# Patient Record
Sex: FEMALE | Race: WHITE | Employment: OTHER
[De-identification: names, ages, dates, MRNs, and addresses within clinical notes are randomized per-mention and may not be internally consistent; named-entity substitution may affect disease eponyms.]

---

## 2017-01-30 ENCOUNTER — OFFICE VISIT (OUTPATIENT)
Dept: PULMONOLOGY | Facility: CLINIC | Age: 67
End: 2017-01-30

## 2017-01-30 VITALS
RESPIRATION RATE: 16 BRPM | SYSTOLIC BLOOD PRESSURE: 145 MMHG | DIASTOLIC BLOOD PRESSURE: 76 MMHG | OXYGEN SATURATION: 96 % | WEIGHT: 231.6 LBS | BODY MASS INDEX: 37.22 KG/M2 | HEART RATE: 40 BPM | HEIGHT: 66 IN

## 2017-01-30 DIAGNOSIS — Z99.89 OSA ON CPAP: Primary | ICD-10-CM

## 2017-01-30 DIAGNOSIS — J45.30 MILD PERSISTENT ASTHMA WITHOUT COMPLICATION: ICD-10-CM

## 2017-01-30 DIAGNOSIS — D86.3 SARCOIDOSIS OF SKIN (HCC): ICD-10-CM

## 2017-01-30 DIAGNOSIS — G47.33 OSA ON CPAP: Primary | ICD-10-CM

## 2017-01-30 DIAGNOSIS — R05.3 CHRONIC COUGH: ICD-10-CM

## 2017-01-30 PROCEDURE — 99213 OFFICE O/P EST LOW 20 MIN: CPT | Performed by: INTERNAL MEDICINE

## 2017-01-30 PROCEDURE — G8427 DOCREV CUR MEDS BY ELIG CLIN: HCPCS | Performed by: INTERNAL MEDICINE

## 2017-01-30 PROCEDURE — 4040F PNEUMOC VAC/ADMIN/RCVD: CPT | Performed by: INTERNAL MEDICINE

## 2017-01-30 PROCEDURE — 1123F ACP DISCUSS/DSCN MKR DOCD: CPT | Performed by: INTERNAL MEDICINE

## 2017-01-30 PROCEDURE — 3014F SCREEN MAMMO DOC REV: CPT | Performed by: INTERNAL MEDICINE

## 2017-01-30 PROCEDURE — 3017F COLORECTAL CA SCREEN DOC REV: CPT | Performed by: INTERNAL MEDICINE

## 2017-01-30 PROCEDURE — 1036F TOBACCO NON-USER: CPT | Performed by: INTERNAL MEDICINE

## 2017-01-30 PROCEDURE — G8419 CALC BMI OUT NRM PARAM NOF/U: HCPCS | Performed by: INTERNAL MEDICINE

## 2017-01-30 PROCEDURE — G8484 FLU IMMUNIZE NO ADMIN: HCPCS | Performed by: INTERNAL MEDICINE

## 2017-01-30 PROCEDURE — 1090F PRES/ABSN URINE INCON ASSESS: CPT | Performed by: INTERNAL MEDICINE

## 2017-01-30 PROCEDURE — G8400 PT W/DXA NO RESULTS DOC: HCPCS | Performed by: INTERNAL MEDICINE

## 2017-01-30 RX ORDER — LORAZEPAM 0.5 MG/1
0.5 TABLET ORAL EVERY 6 HOURS PRN
COMMUNITY

## 2017-01-30 RX ORDER — FEXOFENADINE HCL 180 MG/1
180 TABLET ORAL DAILY
COMMUNITY

## 2017-01-30 RX ORDER — OMEPRAZOLE 40 MG/1
40 CAPSULE, DELAYED RELEASE ORAL DAILY
COMMUNITY

## 2017-01-30 RX ORDER — CROMOLYN SODIUM 20 MG/2ML
20 INHALANT INTRABRONCHIAL DAILY PRN
COMMUNITY
End: 2019-01-21 | Stop reason: SDUPTHER

## 2017-01-30 RX ORDER — LEVALBUTEROL INHALATION SOLUTION 0.63 MG/3ML
1 SOLUTION RESPIRATORY (INHALATION) EVERY 8 HOURS PRN
COMMUNITY
End: 2017-01-30 | Stop reason: SDUPTHER

## 2017-01-30 RX ORDER — BENZONATATE 100 MG/1
100 CAPSULE ORAL 3 TIMES DAILY PRN
Qty: 90 CAPSULE | Refills: 11 | Status: SHIPPED | OUTPATIENT
Start: 2017-01-30 | End: 2017-02-06

## 2017-01-30 RX ORDER — LEVALBUTEROL INHALATION SOLUTION 0.63 MG/3ML
1 SOLUTION RESPIRATORY (INHALATION) EVERY 8 HOURS PRN
Qty: 90 VIAL | Refills: 11 | Status: SHIPPED | OUTPATIENT
Start: 2017-01-30 | End: 2018-04-20 | Stop reason: SDUPTHER

## 2017-01-30 RX ORDER — HYDROCHLOROTHIAZIDE 25 MG/1
25 TABLET ORAL DAILY
COMMUNITY

## 2017-01-30 RX ORDER — MULTIVITAMIN WITH IRON
250 TABLET ORAL DAILY
COMMUNITY

## 2017-01-30 ASSESSMENT — ENCOUNTER SYMPTOMS
EYES NEGATIVE: 1
ABDOMINAL DISTENTION: 1
COLOR CHANGE: 0
APNEA: 1

## 2017-03-24 ENCOUNTER — TELEPHONE (OUTPATIENT)
Dept: PULMONOLOGY | Age: 67
End: 2017-03-24

## 2017-04-11 ENCOUNTER — TELEPHONE (OUTPATIENT)
Dept: PULMONOLOGY | Age: 67
End: 2017-04-11

## 2017-04-11 DIAGNOSIS — D86.3 SARCOIDOSIS OF SKIN (HCC): ICD-10-CM

## 2017-04-11 DIAGNOSIS — Z99.89 OSA ON CPAP: Primary | ICD-10-CM

## 2017-04-11 DIAGNOSIS — G47.33 OSA ON CPAP: Primary | ICD-10-CM

## 2017-04-11 DIAGNOSIS — R05.3 CHRONIC COUGH: ICD-10-CM

## 2017-08-11 ENCOUNTER — HOSPITAL ENCOUNTER (OUTPATIENT)
Dept: MAMMOGRAPHY | Age: 67
Discharge: HOME | End: 2017-08-11
Payer: MEDICARE

## 2017-08-11 DIAGNOSIS — Z12.31 ENCOUNTER FOR SCREENING MAMMOGRAM FOR MALIGNANT NEOPLASM OF BREAST: ICD-10-CM

## 2017-08-11 PROCEDURE — G0202 SCR MAMMO BI INCL CAD: HCPCS

## 2018-01-22 ENCOUNTER — OFFICE VISIT (OUTPATIENT)
Dept: PULMONOLOGY | Age: 68
End: 2018-01-22
Payer: MEDICARE

## 2018-01-22 VITALS
BODY MASS INDEX: 40.18 KG/M2 | HEIGHT: 66 IN | DIASTOLIC BLOOD PRESSURE: 77 MMHG | WEIGHT: 250 LBS | SYSTOLIC BLOOD PRESSURE: 149 MMHG | HEART RATE: 64 BPM | TEMPERATURE: 97.2 F | OXYGEN SATURATION: 99 % | RESPIRATION RATE: 14 BRPM

## 2018-01-22 DIAGNOSIS — Z99.89 OSA ON CPAP: ICD-10-CM

## 2018-01-22 DIAGNOSIS — G47.33 OSA ON CPAP: ICD-10-CM

## 2018-01-22 DIAGNOSIS — D86.9 SARCOID: ICD-10-CM

## 2018-01-22 DIAGNOSIS — E66.01 MORBID OBESITY (HCC): ICD-10-CM

## 2018-01-22 DIAGNOSIS — J45.909 MILD ASTHMA, UNSPECIFIED WHETHER COMPLICATED, UNSPECIFIED WHETHER PERSISTENT: ICD-10-CM

## 2018-01-22 DIAGNOSIS — R06.09 DYSPNEA ON EXERTION: Primary | ICD-10-CM

## 2018-01-22 PROCEDURE — 99213 OFFICE O/P EST LOW 20 MIN: CPT | Performed by: NURSE PRACTITIONER

## 2018-01-22 RX ORDER — VITAMIN B COMPLEX
1 CAPSULE ORAL DAILY
COMMUNITY

## 2018-01-22 ASSESSMENT — ENCOUNTER SYMPTOMS
SHORTNESS OF BREATH: 1
ALLERGIC/IMMUNOLOGIC NEGATIVE: 1
ABDOMINAL PAIN: 1
COUGH: 0
WHEEZING: 0
EYES NEGATIVE: 1
CHEST TIGHTNESS: 1

## 2018-01-22 NOTE — PROGRESS NOTES
Subjective:      Patient ID: Adelaida Forrester is a 75 y.o. female. HPI    Priti Lopez is here for a follow up for her JANIE, asthma, and sarcoid. She notes that she is compliant with CPAP going to bed at 12-1 am, and wakes at 8 am with restful sleep. She does note that she has gained 28 pounds over the last year, and is questioning if it is related to her Levemir use. She manages her blood suagars at home with 4x daily accuchecks and reports her blood sugar is rarely 200. She is concerned that she has been having a 3 month history of shortness of breath and a midsternal \"catch\" when she is exposed to cold weather. She states she is covering her mouth and nose prior to going outside, but this \"catch\" is interfering with her daily activites, and she is now requiring her  to drive her to and from work. She denies coughing and wheezing associated, and reports that it feels like the air is not \"getting down\" and has to take several panting type deep breaths to relieve the sensation. She denies shortness of breath with activity, walking to her car in the garage, grocery shopping etc, no cough, no fevers, no sleep disturbances. Review of Systems   Constitutional: Positive for activity change. No is having her  drive her to and from work due the sensation of a midsternal \"catch\" when she is exposed to cold air. HENT: Negative. Eyes: Negative. Respiratory: Positive for chest tightness and shortness of breath. Negative for cough and wheezing. She is experiencing a midsternal catch, with shortness of breath when exposed to cold air, she has to take several panting deep breaths to relieve sensation of the \"catch\" so that air \"can go down\" no cough no wheeze. Cardiovascular: Negative. Gastrointestinal: Positive for abdominal pain. Upper abdominal pain/tenderness at the previous abdominal abscess site. With subjective increased \"puffiness\".  She denies fevers, pain, [phone]    Patient Name: Teja Ley: [id number]  Path Number: [id number]  Collected: 7/20/2015  Received: 7/21/2015  Reported: 7/22/2015 13:37    -- Diagnosis --  SKIN, RIGHT NOSE, PUNCH BIOPSY:      -  DERMAL TELANGIECTASIA, SUGGESTIVE OF AN EARLY HEMANGIOMA. -  SEE COMMENT. -- Diagnosis Comment --  IF THIS DOES NOT APPEAR TO BE A LOCALIZED PROCESS CLINICALLY, THE  DIFFERENTIAL DIAGNOSIS COULD ALSO INCLUDE THE TELANGIECTATIC PATTERN  OR ACNE ROSACEA. Durga Bonilla M.D.  **Electronically Signed Out**         als/7/22/2015      Clinical Information  Pre-op Diagnosis:  ROSACEA, SARCOID, SEB. DERM    Operative Findings:  R NOSE  RED PATCH  Operation Performed:  PUNCH      Source of Specimen  1: JAR #1 R NOSE    Gross Description  \"AMBER PEARSON,  R NOSE\" A 0.2 cm long x 0.2 cm diameter tan  punch. Inked intact, 1cs. as      Microscopic Description  Skin to the deep dermis shows unremarkable epidermis. The dermis is  elastotic and contains increased numbers of dilated blood vessels in  the superficial and mid dermis. There is a sparse perivascular  lymphocytic infiltrate, but there is no evidence of perifollicular  inflammation or epidermal spongiosis. There is no evidence of  granulomatous inflammation or malignancy. TR-0         Assessment:      1. Dyspnea on exertion    2. JANIE on CPAP    3. Mild asthma, unspecified whether complicated, unspecified whether persistent    4. Sarcoid ([address]    5. Morbid obesity ([facility] [address]          Plan:      1. PFT (she will return to schedule with [facility])  2. 2 view CXR  3. Handicap sticker per Dr. Attila Almanza  4. Advised to use Xopenex prior to going outside/activity, and to cover nose and mouth prior to cold air exposure. 5. Follow up in 1 year, or sooner if needed. We will call her with CXR results. 6. Continue Xopenex and Intal nebulizers. States she is allergic to MDI propellant.    7. Discussed Flu

## 2018-01-31 ENCOUNTER — OFFICE VISIT (OUTPATIENT)
Dept: PULMONOLOGY | Age: 68
End: 2018-01-31
Payer: MEDICARE

## 2018-01-31 ENCOUNTER — HOSPITAL ENCOUNTER (OUTPATIENT)
Dept: GENERAL RADIOLOGY | Age: 68
Discharge: HOME | End: 2018-02-02
Payer: MEDICARE

## 2018-01-31 ENCOUNTER — HOSPITAL ENCOUNTER (OUTPATIENT)
Age: 68
Discharge: HOME | End: 2018-02-02
Payer: MEDICARE

## 2018-01-31 ENCOUNTER — TELEPHONE (OUTPATIENT)
Dept: PULMONOLOGY | Age: 68
End: 2018-01-31

## 2018-01-31 VITALS — WEIGHT: 250 LBS | HEIGHT: 66 IN | OXYGEN SATURATION: 99 % | HEART RATE: 71 BPM | BODY MASS INDEX: 40.18 KG/M2

## 2018-01-31 DIAGNOSIS — R06.09 DYSPNEA ON EXERTION: ICD-10-CM

## 2018-01-31 DIAGNOSIS — D86.9 SARCOID: Primary | ICD-10-CM

## 2018-01-31 DIAGNOSIS — G47.33 OSA ON CPAP: ICD-10-CM

## 2018-01-31 DIAGNOSIS — J45.50 SEVERE PERSISTENT ASTHMA WITHOUT COMPLICATION: Primary | ICD-10-CM

## 2018-01-31 DIAGNOSIS — Z99.89 OSA ON CPAP: ICD-10-CM

## 2018-01-31 PROCEDURE — 94726 PLETHYSMOGRAPHY LUNG VOLUMES: CPT | Performed by: INTERNAL MEDICINE

## 2018-01-31 PROCEDURE — 94375 RESPIRATORY FLOW VOLUME LOOP: CPT | Performed by: INTERNAL MEDICINE

## 2018-01-31 PROCEDURE — 71046 X-RAY EXAM CHEST 2 VIEWS: CPT

## 2018-01-31 PROCEDURE — 94729 DIFFUSING CAPACITY: CPT | Performed by: INTERNAL MEDICINE

## 2018-01-31 NOTE — TELEPHONE ENCOUNTER
Patient says she was supposed to get a handicap sticker the last time she was here on 1/22/2018 and I don't see a order. Can you please put this order in. Thank you.

## 2018-02-12 ENCOUNTER — TELEPHONE (OUTPATIENT)
Dept: PULMONOLOGY | Age: 68
End: 2018-02-12

## 2018-02-12 DIAGNOSIS — R91.1 LUNG NODULE SEEN ON IMAGING STUDY: Primary | ICD-10-CM

## 2018-02-12 NOTE — TELEPHONE ENCOUNTER
Paige Davenport NP came to me to tell me this patient needs a CT. I spoke to the patient, gave her schedulings number and told her to call us a few days after to see what the results are.

## 2018-02-16 ENCOUNTER — HOSPITAL ENCOUNTER (OUTPATIENT)
Dept: CT IMAGING | Facility: CLINIC | Age: 68
Discharge: HOME | End: 2018-02-18
Payer: MEDICARE

## 2018-02-16 DIAGNOSIS — R91.1 LUNG NODULE SEEN ON IMAGING STUDY: ICD-10-CM

## 2018-02-16 PROCEDURE — 71250 CT THORAX DX C-: CPT

## 2018-03-07 RX ORDER — BENZONATATE 100 MG/1
CAPSULE ORAL
Qty: 90 CAPSULE | Refills: 10 | Status: SHIPPED | OUTPATIENT
Start: 2018-03-07 | End: 2019-03-11 | Stop reason: SDUPTHER

## 2018-04-20 DIAGNOSIS — J45.30 MILD PERSISTENT ASTHMA WITHOUT COMPLICATION: ICD-10-CM

## 2018-04-23 RX ORDER — LEVALBUTEROL INHALATION SOLUTION 0.63 MG/3ML
SOLUTION RESPIRATORY (INHALATION)
Qty: 90 VIAL | Refills: 9 | Status: SHIPPED | OUTPATIENT
Start: 2018-04-23 | End: 2021-10-12 | Stop reason: SDUPTHER

## 2018-08-06 ENCOUNTER — OFFICE VISIT (OUTPATIENT)
Dept: PULMONOLOGY | Age: 68
End: 2018-08-06
Payer: MEDICARE

## 2018-08-06 VITALS
SYSTOLIC BLOOD PRESSURE: 151 MMHG | TEMPERATURE: 98.1 F | BODY MASS INDEX: 41.95 KG/M2 | OXYGEN SATURATION: 96 % | DIASTOLIC BLOOD PRESSURE: 75 MMHG | HEART RATE: 63 BPM | RESPIRATION RATE: 14 BRPM | HEIGHT: 66 IN | WEIGHT: 261 LBS

## 2018-08-06 DIAGNOSIS — R04.2 HEMOPTYSIS: ICD-10-CM

## 2018-08-06 DIAGNOSIS — G47.33 OSA ON CPAP: Primary | ICD-10-CM

## 2018-08-06 DIAGNOSIS — R05.3 CHRONIC COUGH: ICD-10-CM

## 2018-08-06 DIAGNOSIS — J45.909 MILD ASTHMA, UNSPECIFIED WHETHER COMPLICATED, UNSPECIFIED WHETHER PERSISTENT: ICD-10-CM

## 2018-08-06 DIAGNOSIS — R91.1 LUNG NODULE SEEN ON IMAGING STUDY: ICD-10-CM

## 2018-08-06 DIAGNOSIS — Z99.89 OSA ON CPAP: Primary | ICD-10-CM

## 2018-08-06 DIAGNOSIS — J40 BRONCHITIS: ICD-10-CM

## 2018-08-06 PROCEDURE — 1036F TOBACCO NON-USER: CPT | Performed by: NURSE PRACTITIONER

## 2018-08-06 PROCEDURE — 1123F ACP DISCUSS/DSCN MKR DOCD: CPT | Performed by: NURSE PRACTITIONER

## 2018-08-06 PROCEDURE — 1101F PT FALLS ASSESS-DOCD LE1/YR: CPT | Performed by: NURSE PRACTITIONER

## 2018-08-06 PROCEDURE — 4040F PNEUMOC VAC/ADMIN/RCVD: CPT | Performed by: NURSE PRACTITIONER

## 2018-08-06 PROCEDURE — G8400 PT W/DXA NO RESULTS DOC: HCPCS | Performed by: NURSE PRACTITIONER

## 2018-08-06 PROCEDURE — 1090F PRES/ABSN URINE INCON ASSESS: CPT | Performed by: NURSE PRACTITIONER

## 2018-08-06 PROCEDURE — 3017F COLORECTAL CA SCREEN DOC REV: CPT | Performed by: NURSE PRACTITIONER

## 2018-08-06 PROCEDURE — G8427 DOCREV CUR MEDS BY ELIG CLIN: HCPCS | Performed by: NURSE PRACTITIONER

## 2018-08-06 PROCEDURE — 99214 OFFICE O/P EST MOD 30 MIN: CPT | Performed by: NURSE PRACTITIONER

## 2018-08-06 PROCEDURE — G8417 CALC BMI ABV UP PARAM F/U: HCPCS | Performed by: NURSE PRACTITIONER

## 2018-08-06 RX ORDER — AZITHROMYCIN 250 MG/1
TABLET, FILM COATED ORAL
Qty: 1 PACKET | Refills: 0 | Status: SHIPPED | OUTPATIENT
Start: 2018-08-06 | End: 2018-08-10

## 2018-08-06 ASSESSMENT — ENCOUNTER SYMPTOMS
ALLERGIC/IMMUNOLOGIC NEGATIVE: 1
GASTROINTESTINAL NEGATIVE: 1
EYES NEGATIVE: 1

## 2018-08-06 NOTE — PROGRESS NOTES
Subjective:      Patient ID: Osei Kimble is a 75 y.o. female. HPI:  Patient here for an acute visit for hemoptysis. Patient reports that on Saturday she felt fatigued and had the \"catch\" in the middle of her chest behind her sternum that she attributes to the first sign of the start of bronchitis. Patient reports that she laid down to take a nap and did use her CPAP intending to only sleep for 1 hour but due to her underlying fatigue slept for a total of 4 hours. On Saturday afternoon late afternoon patient reports that she was coughing as per her usual and had an episode where she had 10-12 tissues with a quarter-sized amount of bright red blood. No further interventions at that time and the bleeding stopped. Then on Sunday afternoon she had a coughing jag as per her usual and had an episode of blood-tinged sputum. Has had reports no further blood in her sputum today. She is on a baby aspirin and has been for many years. She is asking for an empiric prescription for antibiotics due to concern for starting with bronchitis. In the setting of her cough with hemoptysis that is now resolved and her symptoms we will provide an empiric antibiotic prescription and patient has been instructed not to utilize unless she experiences purulent sputum. Hemoptysis 10-12 tissues with a quarter sized amount bright red blood, late afternoon, then last night she coughed twice and blood tinged. No flowsheet data found.     BP (!) 151/75 (Site: Right Arm, Position: Sitting, Cuff Size: Medium Adult) Comment (Site): forearm  Pulse 63   Temp 98.1 °F (36.7 °C)   Resp 14   Ht 5' 6\" (1.676 m)   Wt 261 lb (118.4 kg)   SpO2 96% Comment: room air at rest  BMI 42.13 kg/m²     Past Medical History:   Diagnosis Date    Asthma     Hemoptysis     Hypertension     Lung nodule     Sarcoidosis     Sleep apnea      Past Surgical History:   Procedure Laterality Date    ABDOMEN SURGERY  11/2016    abdominal drain placed    COLONOSCOPY  2006 and 2014    DILATION AND CURETTAGE OF UTERUS  1978    EYE SURGERY Left 2007    cataract    EYE SURGERY Right 2013    cataract    HYSTERECTOMY  1991    VULVECTOMY  2005    partial     No family history on file. Social History     Social History    Marital status:      Spouse name: N/A    Number of children: N/A    Years of education: N/A     Occupational History    Not on file. Social History Main Topics    Smoking status: Never Smoker    Smokeless tobacco: Never Used    Alcohol use No    Drug use: No    Sexual activity: Not on file     Other Topics Concern    Not on file     Social History Narrative    No narrative on file       Review of Systems   Constitutional: Negative. HENT: Negative. Eyes: Negative. Respiratory:        Cough productive of bright red blood, 10-12 tissue. Approximately a quarter-sized amount. This occurred on Saturday evening. On Sunday late afternoon she had blood-tinged sputum ×2 tissue. Ongoing chronic cough productive of clear to white mucus. She also reports a substernal \"catch\" that she attributes to a symptom of early bronchitis. Dyspnea with exertion. Cardiovascular: Negative. Gastrointestinal: Negative. Endocrine: Negative. Genitourinary: Negative. Musculoskeletal: Negative. Skin: Negative. Allergic/Immunologic: Negative. Neurological: Negative. Hematological: Negative. Psychiatric/Behavioral: Negative.         Objective:     Physical Exam  General appearance - alert, well appearing, and in no distress, oriented to person, place, and time and overweight  Mental status - alert, oriented to person, place, and time, normal mood, behavior, speech, dress, motor activity, and thought processes  Eyes - pupils equal and reactive, extraocular eye movements intact  Ears - not examined  Nose - normal and patent, no erythema, discharge or polyps  Mouth - mucous membranes moist, pharynx normal Description  Skin to the deep dermis shows unremarkable epidermis. The dermis is  elastotic and contains increased numbers of dilated blood vessels in  the superficial and mid dermis. There is a sparse perivascular  lymphocytic infiltrate, but there is no evidence of perifollicular  inflammation or epidermal spongiosis. There is no evidence of  granulomatous inflammation or malignancy. TR-0         No results found. Assessment:      1. JANIE on CPAP    2. Mild asthma, unspecified whether complicated, unspecified whether persistent    3. Chronic cough    4. Lung nodule seen on imaging study    5. Hemoptysis    6. Bronchitis          Plan:      1. Dictations reviewed, no changes. 2. Refills were provided - no  3. Educated and clarified the medication use. 4. Recommend flu vaccination in the fall annually. Due in fall of 2018  5. Recommendations given regarding pneumococcal vaccinations. No immunizations documented. We'll need to follow-up at next visit. 6. Maintain an active lifestyle. 7. Patient's questions were answered to her satisfaction. 8. Patient is not a smoker  9. Empiric antibiotic prescription provided. Patient advised not to take in less she experiences purulent sputum. 10. We'll see the patient back in 5 months, at her next scheduled appointment. 11. Patient advised to call the office to be seen sooner if she has purulent sputum or recurrent/ongoing hemoptysis.         Electronically signed by EZEQUIEL De Santiago CNP on 8/6/2018 at 2:44 PM

## 2018-10-12 ENCOUNTER — HOSPITAL ENCOUNTER (OUTPATIENT)
Dept: MAMMOGRAPHY | Age: 68
Discharge: HOME | End: 2018-10-14
Payer: MEDICARE

## 2018-10-12 DIAGNOSIS — Z12.31 ENCOUNTER FOR SCREENING MAMMOGRAM FOR MALIGNANT NEOPLASM OF BREAST: ICD-10-CM

## 2018-10-12 PROCEDURE — 77067 SCR MAMMO BI INCL CAD: CPT

## 2018-10-24 ENCOUNTER — TELEPHONE (OUTPATIENT)
Dept: PULMONOLOGY | Age: 68
End: 2018-10-24

## 2018-10-24 DIAGNOSIS — G47.33 OSA ON CPAP: Primary | ICD-10-CM

## 2018-10-24 DIAGNOSIS — Z99.89 OSA ON CPAP: Primary | ICD-10-CM

## 2019-01-21 ENCOUNTER — OFFICE VISIT (OUTPATIENT)
Dept: PULMONOLOGY | Age: 69
End: 2019-01-21
Payer: MEDICARE

## 2019-01-21 VITALS
WEIGHT: 263 LBS | BODY MASS INDEX: 42.27 KG/M2 | DIASTOLIC BLOOD PRESSURE: 80 MMHG | RESPIRATION RATE: 16 BRPM | HEART RATE: 60 BPM | HEIGHT: 66 IN | SYSTOLIC BLOOD PRESSURE: 137 MMHG | OXYGEN SATURATION: 97 %

## 2019-01-21 DIAGNOSIS — Z99.89 OSA ON CPAP: Primary | ICD-10-CM

## 2019-01-21 DIAGNOSIS — J45.909 MILD ASTHMA, UNSPECIFIED WHETHER COMPLICATED, UNSPECIFIED WHETHER PERSISTENT: ICD-10-CM

## 2019-01-21 DIAGNOSIS — D86.9 SARCOID: ICD-10-CM

## 2019-01-21 DIAGNOSIS — Z28.21 REFUSED PNEUMOCOCCAL VACCINATION: ICD-10-CM

## 2019-01-21 DIAGNOSIS — E66.01 MORBID OBESITY (HCC): ICD-10-CM

## 2019-01-21 DIAGNOSIS — Z28.21 REFUSED INFLUENZA VACCINE: ICD-10-CM

## 2019-01-21 DIAGNOSIS — G47.33 OSA ON CPAP: Primary | ICD-10-CM

## 2019-01-21 PROCEDURE — G8427 DOCREV CUR MEDS BY ELIG CLIN: HCPCS | Performed by: NURSE PRACTITIONER

## 2019-01-21 PROCEDURE — 1101F PT FALLS ASSESS-DOCD LE1/YR: CPT | Performed by: NURSE PRACTITIONER

## 2019-01-21 PROCEDURE — 3017F COLORECTAL CA SCREEN DOC REV: CPT | Performed by: NURSE PRACTITIONER

## 2019-01-21 PROCEDURE — 1036F TOBACCO NON-USER: CPT | Performed by: NURSE PRACTITIONER

## 2019-01-21 PROCEDURE — G8417 CALC BMI ABV UP PARAM F/U: HCPCS | Performed by: NURSE PRACTITIONER

## 2019-01-21 PROCEDURE — 1123F ACP DISCUSS/DSCN MKR DOCD: CPT | Performed by: NURSE PRACTITIONER

## 2019-01-21 PROCEDURE — G8400 PT W/DXA NO RESULTS DOC: HCPCS | Performed by: NURSE PRACTITIONER

## 2019-01-21 PROCEDURE — 99214 OFFICE O/P EST MOD 30 MIN: CPT | Performed by: NURSE PRACTITIONER

## 2019-01-21 PROCEDURE — G8484 FLU IMMUNIZE NO ADMIN: HCPCS | Performed by: NURSE PRACTITIONER

## 2019-01-21 PROCEDURE — 4040F PNEUMOC VAC/ADMIN/RCVD: CPT | Performed by: NURSE PRACTITIONER

## 2019-01-21 PROCEDURE — 1090F PRES/ABSN URINE INCON ASSESS: CPT | Performed by: NURSE PRACTITIONER

## 2019-01-21 RX ORDER — CROMOLYN SODIUM 20 MG/2ML
20 INHALANT INTRABRONCHIAL DAILY PRN
Qty: 240 ML | Refills: 11 | Status: SHIPPED | OUTPATIENT
Start: 2019-01-21 | End: 2019-09-09 | Stop reason: SDUPTHER

## 2019-01-21 RX ORDER — AZITHROMYCIN 250 MG/1
250 TABLET, FILM COATED ORAL SEE ADMIN INSTRUCTIONS
Qty: 6 TABLET | Refills: 0 | Status: SHIPPED | OUTPATIENT
Start: 2019-01-21 | End: 2019-01-26

## 2019-01-21 ASSESSMENT — ENCOUNTER SYMPTOMS
EYES NEGATIVE: 1
ALLERGIC/IMMUNOLOGIC NEGATIVE: 1
GASTROINTESTINAL NEGATIVE: 1

## 2019-03-11 RX ORDER — BENZONATATE 100 MG/1
CAPSULE ORAL
Qty: 90 CAPSULE | Refills: 2 | Status: SHIPPED | OUTPATIENT
Start: 2019-03-11 | End: 2020-01-15

## 2019-07-16 ENCOUNTER — HOSPITAL ENCOUNTER (OUTPATIENT)
Dept: GENERAL RADIOLOGY | Age: 69
Discharge: HOME | End: 2019-07-18
Payer: MEDICARE

## 2019-07-16 ENCOUNTER — OFFICE VISIT (OUTPATIENT)
Dept: PULMONOLOGY | Age: 69
End: 2019-07-16
Payer: MEDICARE

## 2019-07-16 ENCOUNTER — HOSPITAL ENCOUNTER (OUTPATIENT)
Age: 69
Setting detail: OBSERVATION
Discharge: HOME | End: 2019-07-17
Attending: EMERGENCY MEDICINE | Admitting: EMERGENCY MEDICINE
Payer: MEDICARE

## 2019-07-16 ENCOUNTER — HOSPITAL ENCOUNTER (OUTPATIENT)
Age: 69
Discharge: HOME | End: 2019-07-16
Payer: MEDICARE

## 2019-07-16 ENCOUNTER — HOSPITAL ENCOUNTER (OUTPATIENT)
Age: 69
Discharge: HOME | End: 2019-07-18
Payer: MEDICARE

## 2019-07-16 VITALS
RESPIRATION RATE: 12 BRPM | OXYGEN SATURATION: 98 % | HEART RATE: 35 BPM | BODY MASS INDEX: 43.42 KG/M2 | TEMPERATURE: 97.5 F | WEIGHT: 270.2 LBS | HEIGHT: 66 IN | DIASTOLIC BLOOD PRESSURE: 82 MMHG | SYSTOLIC BLOOD PRESSURE: 138 MMHG

## 2019-07-16 DIAGNOSIS — R06.09 OTHER FORM OF DYSPNEA: Primary | ICD-10-CM

## 2019-07-16 DIAGNOSIS — E66.01 MORBID OBESITY (HCC): ICD-10-CM

## 2019-07-16 DIAGNOSIS — D86.9 SARCOID: Primary | ICD-10-CM

## 2019-07-16 DIAGNOSIS — D86.9 SARCOID: ICD-10-CM

## 2019-07-16 DIAGNOSIS — G47.33 OSA ON CPAP: ICD-10-CM

## 2019-07-16 DIAGNOSIS — Z99.89 OSA ON CPAP: ICD-10-CM

## 2019-07-16 DIAGNOSIS — R00.1 BRADYCARDIA: ICD-10-CM

## 2019-07-16 DIAGNOSIS — J45.30 MILD PERSISTENT ASTHMA WITHOUT COMPLICATION: ICD-10-CM

## 2019-07-16 PROBLEM — R56.9 SEIZURE (HCC): Status: ACTIVE | Noted: 2019-07-16

## 2019-07-16 PROBLEM — R06.00 DYSPNEA: Status: ACTIVE | Noted: 2019-07-16

## 2019-07-16 PROBLEM — K13.0 CELLULITIS OF LIP: Status: ACTIVE | Noted: 2019-07-16

## 2019-07-16 PROBLEM — M71.9 BURSITIS: Status: ACTIVE | Noted: 2019-07-16

## 2019-07-16 PROBLEM — G40.909 EPILEPTIC SEIZURE (HCC): Status: ACTIVE | Noted: 2019-07-16

## 2019-07-16 PROBLEM — H25.9 AGE-RELATED CATARACT: Status: ACTIVE | Noted: 2019-07-16

## 2019-07-16 PROBLEM — J30.9 ALLERGIC RHINITIS: Status: ACTIVE | Noted: 2019-07-16

## 2019-07-16 PROBLEM — I10 ESSENTIAL HYPERTENSION: Status: ACTIVE | Noted: 2019-07-16

## 2019-07-16 PROBLEM — E11.9 DIABETES MELLITUS (HCC): Status: ACTIVE | Noted: 2019-07-16

## 2019-07-16 PROBLEM — J06.9 UPPER RESPIRATORY INFECTION: Status: ACTIVE | Noted: 2019-07-16

## 2019-07-16 PROBLEM — M76.60 ACHILLES TENDINITIS: Status: ACTIVE | Noted: 2019-07-16

## 2019-07-16 PROBLEM — Z01.419 PAP SMEAR, LOW-RISK: Status: ACTIVE | Noted: 2017-04-03

## 2019-07-16 PROBLEM — S80.00XA CONTUSION OF KNEE: Status: ACTIVE | Noted: 2019-07-16

## 2019-07-16 PROBLEM — N90.4 VULVAR DYSTROPHY: Status: ACTIVE | Noted: 2017-04-03

## 2019-07-16 PROBLEM — Z78.0 MENOPAUSE PRESENT: Status: ACTIVE | Noted: 2017-04-03

## 2019-07-16 PROBLEM — B37.0 CANDIDIASIS OF MOUTH: Status: ACTIVE | Noted: 2019-07-16

## 2019-07-16 PROBLEM — E27.8 ADRENAL HYPERPLASIA (HCC): Status: ACTIVE | Noted: 2019-07-16

## 2019-07-16 PROBLEM — R20.0 NUMBNESS OF HAND: Status: ACTIVE | Noted: 2019-07-16

## 2019-07-16 LAB
ABSOLUTE EOS #: 0.16 K/UL (ref 0–0.44)
ABSOLUTE IMMATURE GRANULOCYTE: 0.03 K/UL (ref 0–0.3)
ABSOLUTE LYMPH #: 1.97 K/UL (ref 1.1–3.7)
ABSOLUTE MONO #: 0.78 K/UL (ref 0.1–1.2)
ANION GAP SERPL CALCULATED.3IONS-SCNC: 14 MMOL/L (ref 9–17)
BASOPHILS # BLD: 1 % (ref 0–2)
BASOPHILS ABSOLUTE: 0.09 K/UL (ref 0–0.2)
BNP INTERPRETATION: ABNORMAL
BUN BLDV-MCNC: 14 MG/DL (ref 8–23)
BUN/CREAT BLD: ABNORMAL (ref 9–20)
CALCIUM SERPL-MCNC: 9.5 MG/DL (ref 8.6–10.4)
CHLORIDE BLD-SCNC: 100 MMOL/L (ref 98–107)
CO2: 24 MMOL/L (ref 20–31)
CREAT SERPL-MCNC: 0.73 MG/DL (ref 0.5–0.9)
DIFFERENTIAL TYPE: ABNORMAL
EOSINOPHILS RELATIVE PERCENT: 2 % (ref 1–4)
GFR AFRICAN AMERICAN: >60 ML/MIN
GFR NON-AFRICAN AMERICAN: >60 ML/MIN
GFR SERPL CREATININE-BSD FRML MDRD: ABNORMAL ML/MIN/{1.73_M2}
GFR SERPL CREATININE-BSD FRML MDRD: ABNORMAL ML/MIN/{1.73_M2}
GLUCOSE BLD-MCNC: 133 MG/DL (ref 65–105)
GLUCOSE BLD-MCNC: 141 MG/DL (ref 65–105)
GLUCOSE BLD-MCNC: 154 MG/DL (ref 70–99)
HCT VFR BLD CALC: 42.2 % (ref 36.3–47.1)
HEMOGLOBIN: 12.9 G/DL (ref 11.9–15.1)
IMMATURE GRANULOCYTES: 0 %
LYMPHOCYTES # BLD: 26 % (ref 24–43)
MAGNESIUM: 1.9 MG/DL (ref 1.6–2.6)
MCH RBC QN AUTO: 26.3 PG (ref 25.2–33.5)
MCHC RBC AUTO-ENTMCNC: 30.6 G/DL (ref 28.4–34.8)
MCV RBC AUTO: 85.9 FL (ref 82.6–102.9)
MONOCYTES # BLD: 10 % (ref 3–12)
NRBC AUTOMATED: 0 PER 100 WBC
PDW BLD-RTO: 15.7 % (ref 11.8–14.4)
PLATELET # BLD: 343 K/UL (ref 138–453)
PLATELET ESTIMATE: ABNORMAL
PMV BLD AUTO: 11.3 FL (ref 8.1–13.5)
POTASSIUM SERPL-SCNC: 4.1 MMOL/L (ref 3.7–5.3)
PRO-BNP: 321 PG/ML
RBC # BLD: 4.91 M/UL (ref 3.95–5.11)
RBC # BLD: ABNORMAL 10*6/UL
SEG NEUTROPHILS: 61 % (ref 36–65)
SEGMENTED NEUTROPHILS ABSOLUTE COUNT: 4.71 K/UL (ref 1.5–8.1)
SODIUM BLD-SCNC: 138 MMOL/L (ref 135–144)
TROPONIN INTERP: NORMAL
TROPONIN INTERP: NORMAL
TROPONIN T: NORMAL NG/ML
TROPONIN T: NORMAL NG/ML
TROPONIN, HIGH SENSITIVITY: 11 NG/L (ref 0–14)
TROPONIN, HIGH SENSITIVITY: 12 NG/L (ref 0–14)
TSH SERPL DL<=0.05 MIU/L-ACNC: 3.01 MIU/L (ref 0.3–5)
WBC # BLD: 7.7 K/UL (ref 3.5–11.3)
WBC # BLD: ABNORMAL 10*3/UL

## 2019-07-16 PROCEDURE — 99285 EMERGENCY DEPT VISIT HI MDM: CPT

## 2019-07-16 PROCEDURE — 93040 RHYTHM ECG WITH REPORT: CPT | Performed by: INTERNAL MEDICINE

## 2019-07-16 PROCEDURE — 71046 X-RAY EXAM CHEST 2 VIEWS: CPT

## 2019-07-16 PROCEDURE — 1090F PRES/ABSN URINE INCON ASSESS: CPT | Performed by: INTERNAL MEDICINE

## 2019-07-16 PROCEDURE — G0378 HOSPITAL OBSERVATION PER HR: HCPCS

## 2019-07-16 PROCEDURE — 6370000000 HC RX 637 (ALT 250 FOR IP): Performed by: STUDENT IN AN ORGANIZED HEALTH CARE EDUCATION/TRAINING PROGRAM

## 2019-07-16 PROCEDURE — 83880 ASSAY OF NATRIURETIC PEPTIDE: CPT

## 2019-07-16 PROCEDURE — 80048 BASIC METABOLIC PNL TOTAL CA: CPT

## 2019-07-16 PROCEDURE — 85025 COMPLETE CBC W/AUTO DIFF WBC: CPT

## 2019-07-16 PROCEDURE — 84443 ASSAY THYROID STIM HORMONE: CPT

## 2019-07-16 PROCEDURE — 83735 ASSAY OF MAGNESIUM: CPT

## 2019-07-16 PROCEDURE — 1036F TOBACCO NON-USER: CPT | Performed by: INTERNAL MEDICINE

## 2019-07-16 PROCEDURE — 93005 ELECTROCARDIOGRAM TRACING: CPT

## 2019-07-16 PROCEDURE — 4040F PNEUMOC VAC/ADMIN/RCVD: CPT | Performed by: INTERNAL MEDICINE

## 2019-07-16 PROCEDURE — 93005 ELECTROCARDIOGRAM TRACING: CPT | Performed by: STUDENT IN AN ORGANIZED HEALTH CARE EDUCATION/TRAINING PROGRAM

## 2019-07-16 PROCEDURE — G8417 CALC BMI ABV UP PARAM F/U: HCPCS | Performed by: INTERNAL MEDICINE

## 2019-07-16 PROCEDURE — 84484 ASSAY OF TROPONIN QUANT: CPT

## 2019-07-16 PROCEDURE — 82947 ASSAY GLUCOSE BLOOD QUANT: CPT

## 2019-07-16 PROCEDURE — 99213 OFFICE O/P EST LOW 20 MIN: CPT | Performed by: INTERNAL MEDICINE

## 2019-07-16 PROCEDURE — 3017F COLORECTAL CA SCREEN DOC REV: CPT | Performed by: INTERNAL MEDICINE

## 2019-07-16 PROCEDURE — G8400 PT W/DXA NO RESULTS DOC: HCPCS | Performed by: INTERNAL MEDICINE

## 2019-07-16 PROCEDURE — 2580000003 HC RX 258: Performed by: STUDENT IN AN ORGANIZED HEALTH CARE EDUCATION/TRAINING PROGRAM

## 2019-07-16 PROCEDURE — G8428 CUR MEDS NOT DOCUMENT: HCPCS | Performed by: INTERNAL MEDICINE

## 2019-07-16 PROCEDURE — 1123F ACP DISCUSS/DSCN MKR DOCD: CPT | Performed by: INTERNAL MEDICINE

## 2019-07-16 RX ORDER — ONDANSETRON 4 MG/1
8 TABLET, ORALLY DISINTEGRATING ORAL EVERY 8 HOURS PRN
Status: DISCONTINUED | OUTPATIENT
Start: 2019-07-16 | End: 2019-07-18 | Stop reason: HOSPADM

## 2019-07-16 RX ORDER — LORAZEPAM 0.5 MG/1
0.5 TABLET ORAL EVERY 6 HOURS PRN
Status: DISCONTINUED | OUTPATIENT
Start: 2019-07-16 | End: 2019-07-18 | Stop reason: HOSPADM

## 2019-07-16 RX ORDER — HYDROCHLOROTHIAZIDE 25 MG/1
25 TABLET ORAL DAILY
Status: DISCONTINUED | OUTPATIENT
Start: 2019-07-16 | End: 2019-07-18 | Stop reason: HOSPADM

## 2019-07-16 RX ORDER — SODIUM CHLORIDE 0.9 % (FLUSH) 0.9 %
10 SYRINGE (ML) INJECTION EVERY 12 HOURS SCHEDULED
Status: DISCONTINUED | OUTPATIENT
Start: 2019-07-16 | End: 2019-07-18 | Stop reason: HOSPADM

## 2019-07-16 RX ORDER — SODIUM CHLORIDE 0.9 % (FLUSH) 0.9 %
10 SYRINGE (ML) INJECTION PRN
Status: DISCONTINUED | OUTPATIENT
Start: 2019-07-16 | End: 2019-07-18 | Stop reason: HOSPADM

## 2019-07-16 RX ORDER — CHOLECALCIFEROL (VITAMIN D3) 10 MCG
1 TABLET ORAL DAILY
Status: DISCONTINUED | OUTPATIENT
Start: 2019-07-16 | End: 2019-07-18 | Stop reason: HOSPADM

## 2019-07-16 RX ORDER — ASPIRIN 81 MG/1
81 TABLET ORAL DAILY
Status: DISCONTINUED | OUTPATIENT
Start: 2019-07-16 | End: 2019-07-18 | Stop reason: HOSPADM

## 2019-07-16 RX ORDER — CROMOLYN SODIUM 20 MG/2ML
20 INHALANT INTRABRONCHIAL DAILY PRN
Status: DISCONTINUED | OUTPATIENT
Start: 2019-07-16 | End: 2019-07-18 | Stop reason: HOSPADM

## 2019-07-16 RX ORDER — LEVALBUTEROL INHALATION SOLUTION 0.63 MG/3ML
0.63 SOLUTION RESPIRATORY (INHALATION) EVERY 8 HOURS PRN
Status: DISCONTINUED | OUTPATIENT
Start: 2019-07-16 | End: 2019-07-18 | Stop reason: HOSPADM

## 2019-07-16 RX ORDER — ACETAMINOPHEN 325 MG/1
650 TABLET ORAL EVERY 4 HOURS PRN
Status: DISCONTINUED | OUTPATIENT
Start: 2019-07-16 | End: 2019-07-18 | Stop reason: HOSPADM

## 2019-07-16 RX ORDER — PANTOPRAZOLE SODIUM 40 MG/1
40 TABLET, DELAYED RELEASE ORAL
Status: DISCONTINUED | OUTPATIENT
Start: 2019-07-17 | End: 2019-07-18 | Stop reason: HOSPADM

## 2019-07-16 RX ORDER — RANITIDINE 150 MG/1
150 TABLET ORAL NIGHTLY
COMMUNITY
End: 2021-06-14 | Stop reason: ALTCHOICE

## 2019-07-16 RX ADMIN — Medication 10 ML: at 22:53

## 2019-07-16 RX ADMIN — LORAZEPAM 0.5 MG: 0.5 TABLET ORAL at 22:53

## 2019-07-16 ASSESSMENT — ENCOUNTER SYMPTOMS
SHORTNESS OF BREATH: 1
GASTROINTESTINAL NEGATIVE: 1
EYES NEGATIVE: 1

## 2019-07-16 NOTE — ED NOTES
Pt went to see Dr. Gianna Lozada today because she had a nonproductive cough. Pt reports feeling tired and fatigued for the last several weeks. EKG obtained in the office, pt found to have multiple PVC's and was sent to the ER for evaluation. Pt arrives A&O x4, denies chest pain and denies shortness of breath. Pt is tearful. Pt has a slight tremor to the left hand and was told by Dr. Gianna Lozada today that she most likely has Parkinson's. Pt denies any pain and  is at bedside.       Stephanie Gruber  07/16/19 [id number]

## 2019-07-16 NOTE — CARE COORDINATION
Case Management Initial Discharge Plan  Sherley Pagan,             Met with:patient to discuss discharge plans. Information verified: address, contacts, phone number, , insurance Yes  PCP: Farzaneh Jonas MD  Date of last visit: December     Insurance Provider: Cari Shay    Discharge Planning    Living Arrangements:  Spouse/Significant Other   Support Systems:  Spouse/Significant Other, Friends/Neighbors    Home has 1 stories  3 stairs to climb to get into front door, na stairs to climb to reach second floor  Location of bedroom/bathroom in home first floor     Patient able to perform ADL's:Independent    Current Services (outpatient & in home) none   DME equipment: Cpap, Nebulizer, Glucometer  DME provider: mago     Pharmacy: [facility]   Potential Assistance Purchasing Medications:  No  Does patient want to participate in local refill/ meds to beds program?  No    Potential Assistance Needed:  N/A    Patient agreeable to home care: No  [place] of choice provided:  n/a    Prior SNF/Rehab Placement and Facility: no  Agreeable to SNF/Rehab: No  [place] of choice provided: n/a   Evaluation: no    Expected Discharge date:     Patient expects to be discharged to:  Home independetnly   Follow Up Appointment: Best Day/ Time:      Transportation provider:   Transportation arrangements needed for discharge: No    Readmission Risk              Risk of Unplanned Readmission:        0             Does patient have a readmission risk score greater than 14?: No  If yes, follow-up appointment must be made within 7 days of discharge.      Discharge Plan: Home independently           Electronically signed by Patrick Mendes RN on 19 at 1:27 PM

## 2019-07-16 NOTE — ED PROVIDER NOTES
[facility]  Emergency Department Encounter  Emergency Medicine Resident     Pt Name: Dariel Zelaya  MRN: [id number]  [place] 1950  Date of evaluation: 7/16/19  PCP:  Britany Ontiveros MD    CHIEF COMPLAINT       Chief Complaint   Patient presents with    Cough     sent by Dr. Arnoldo Adrian for EKG changes. pt went to see Dr. Arnoldo Adrian due to non productive cough, feeling tired, fatigued.  Fatigue     feeling tired, worn out for the past several weeks. intermittent dizziness. HISTORY OF PRESENT ILLNESS  (Location/Symptom, Timing/Onset, Context/Setting, Quality, Duration, Modifying Factors, Severity.)    Dariel Zelaya is a 70 y.o. female with PMH hypertension hyperlipidemia sleep apnea who presents with shortness of breath. At her pulmonologist Dr. Clark Lose office this morning when she had an abnormal EKG and she was instructed to come to the emergency department. Has been feeling fatigued and worn out for the past several weeks. She has a baseline shortness of breath however over the past week it has gotten progressively worse and she has had a nonproductive cough with it. Denies any chest pain fevers chills nausea vomiting diaphoresis abdominal pain back pain weakness in arms or legs. No recent illness. No history of prior blood clots.   No Treatments attempted prior to arrival.        Risk Stratification for Acute Coronary Syndrome   Family history of coronary artery disease - Yes  History of diabetes - No  History of hypertension - Yes  History of hyperlipidemia  - Yes  History of smoking - No  Cocaine use - No  Known coronary artery disease - No    Risk Stratification for Pulmonary Embolism (PE)  Previous PE - No  Malignancy - No  Obesity -Yes  Trauma - No  [place] - No  Pregnancy/postpartum - No  Smoking - No  Prior DVT - No  Immobilization (i.e. leg cast, travel) - No  Surgery within the last 60 days - No  Coagulation disorder - No  Estrogen medicine - DIFFERENTIAL    Basic Metabolic Panel    Troponin    Brain Natriuretic Peptide    Magnesium    TSH with Reflex    EKG 12 Lead    PATIENT STATUS (FROM ED OR OR/PROCEDURAL) Observation       MEDICATIONS ORDERED:  No orders of the defined types were placed in this encounter. DDX:    ACS/MI, Bronchitis, PTX, PNA, Pericarditis, Anxiety    MDM:    Pablo Mccall is a 70 y.o. female who presents with us of breath. She was seen at Dr. Sabino Vu office earlier this morning had an abnormal EKG. On review of EKG demonstrates frequent PVCs. Her vital signs are stable. Patient absolute minimal risk for PE, will not pursue D dimer. We will pursue cardiac work-up with TSH T4 and Mg. We will not obtain new chest x-ray at this visit as chest x-ray was recently performed today and did not demonstrate any acute pathology. His x-ray was initially obtained to assess her sarcoidosis status. If work-up is grossly unremarkable appears to be secondary to anxiety versus bronchitis. DIAGNOSTIC RESULTS / EMERGENCYDEPARTMENT COURSE   LABS:  Labs Reviewed   CBC WITH AUTO DIFFERENTIAL - Abnormal; Notable for the following components:       Result Value    RDW 15.7 (*)     All other components within normal limits   BASIC METABOLIC PANEL - Abnormal; Notable for the following components:    Glucose 154 (*)     All other components within normal limits   BRAIN NATRIURETIC PEPTIDE - Abnormal; Notable for the following components:    Pro- (*)     All other components within normal limits   TROPONIN   TROPONIN   MAGNESIUM   TSH WITH REFLEX       RADIOLOGY:  Xr Chest Standard (2 Vw)    Result Date: 7/16/2019  EXAMINATION: TWO XRAY VIEWS OF THE CHEST 7/16/2019 9:37 am COMPARISON: 01/31/2018. HISTORY: ORDERING SYSTEM PROVIDED HISTORY: Sarcoid TECHNOLOGIST PROVIDED HISTORY: sarcoid FINDINGS: The heart size is within normal limits. The pulmonary vasculature is also within normal limits. No acute infiltrates are seen. The costophrenic angles are sharp bilaterally. No pneumothoraces are noted. There is chronic elevation of the right hemidiaphragm. 1. No active pulmonary disease with no radiographic manifestations of sarcoidosis. 2. Chronic elevation right hemidiaphragm. EMERGENCY DEPARTMENT COURSE:  ED Course as of Jul 16 1256   Tue Jul 16, 2019   1116 EKG: Frequent PVCs with QTC prolongation 503. [RB]   2128 Discussion had with patient about risks and benefits of being admitted to observation unit for overnight evaluation as well as cardiology evaluation versus being discharged. After extensive discussion, patient says that she will be able to schedule an appointment within the next 48 hours to see her primary care physician and does not want to stay for admission. [RB]   6760 Further discussion with patient, she wishes to be admitted to the observation service. Will admit to observation service for cardiology evaluation as well as monitoring of her QTC.     [RB]      ED Course User Index  [RB] Marvin John DO               HEART Risk Score for Chest Pain Patients      History and Physical Exam Suspicion Level   · Nausea/Vomiting   · Diaphoresis   · Radiation   · Related to Exertion  · Quality of Pain     EKG Interpretation  · Normal (0 pts)  · Non-Specific Repolarization Disturbance (1 pt)  · Significant ST-Depression (2 pts)     Age of Patient (in years)  · = 45 (0 pts)  · 46-64 (1 pt)  · = 65 (2 pts)    Risk Factors (number present)  · Hypercholesterolemia  · Hypertension  · Diabetes Mellitus  · Cigarette smoking  · Positive family history  · Obesity  · CAD  · Automatically get 2 points for - SLE, CKDz, HIV, Cocaine abuse     Troponin Levels  · = Normal Limit (0 pts)  · 1-3 Times Normal Limit (1 pt)  · > 3 Times Normal Limit (2 pts)      H&P ECG  Patient Age Risk Factors Troponins   0   Slight Normal   45   None   Normal level   1   Moderate Non-specific    46-64   1-2 risk factors   1-3 x Normal   2   High

## 2019-07-17 VITALS
TEMPERATURE: 98.3 F | DIASTOLIC BLOOD PRESSURE: 71 MMHG | WEIGHT: 266 LBS | HEART RATE: 81 BPM | RESPIRATION RATE: 16 BRPM | SYSTOLIC BLOOD PRESSURE: 148 MMHG | OXYGEN SATURATION: 96 % | HEIGHT: 66 IN | BODY MASS INDEX: 42.75 KG/M2

## 2019-07-17 PROBLEM — I49.8 BIGEMINY: Status: ACTIVE | Noted: 2019-07-17

## 2019-07-17 LAB
GLUCOSE BLD-MCNC: 110 MG/DL (ref 65–105)
GLUCOSE BLD-MCNC: 155 MG/DL (ref 65–105)
GLUCOSE BLD-MCNC: 183 MG/DL (ref 65–105)
LV EF: 48 %
LVEF MODALITY: NORMAL

## 2019-07-17 PROCEDURE — G0378 HOSPITAL OBSERVATION PER HR: HCPCS

## 2019-07-17 PROCEDURE — 6370000000 HC RX 637 (ALT 250 FOR IP): Performed by: STUDENT IN AN ORGANIZED HEALTH CARE EDUCATION/TRAINING PROGRAM

## 2019-07-17 PROCEDURE — 94640 AIRWAY INHALATION TREATMENT: CPT

## 2019-07-17 PROCEDURE — 93005 ELECTROCARDIOGRAM TRACING: CPT | Performed by: STUDENT IN AN ORGANIZED HEALTH CARE EDUCATION/TRAINING PROGRAM

## 2019-07-17 PROCEDURE — 6360000002 HC RX W HCPCS: Performed by: STUDENT IN AN ORGANIZED HEALTH CARE EDUCATION/TRAINING PROGRAM

## 2019-07-17 PROCEDURE — 6370000000 HC RX 637 (ALT 250 FOR IP): Performed by: INTERNAL MEDICINE

## 2019-07-17 PROCEDURE — 93306 TTE W/DOPPLER COMPLETE: CPT

## 2019-07-17 PROCEDURE — 2580000003 HC RX 258: Performed by: STUDENT IN AN ORGANIZED HEALTH CARE EDUCATION/TRAINING PROGRAM

## 2019-07-17 PROCEDURE — 82947 ASSAY GLUCOSE BLOOD QUANT: CPT

## 2019-07-17 RX ORDER — DILTIAZEM HYDROCHLORIDE 60 MG/1
60 TABLET, FILM COATED ORAL EVERY 8 HOURS
Qty: 90 TABLET | Refills: 0 | Status: SHIPPED | OUTPATIENT
Start: 2019-07-17

## 2019-07-17 RX ORDER — DILTIAZEM HYDROCHLORIDE 60 MG/1
60 TABLET, FILM COATED ORAL EVERY 8 HOURS SCHEDULED
Status: DISCONTINUED | OUTPATIENT
Start: 2019-07-17 | End: 2019-07-18 | Stop reason: HOSPADM

## 2019-07-17 RX ADMIN — ASPIRIN 81 MG: 81 TABLET ORAL at 11:51

## 2019-07-17 RX ADMIN — PANTOPRAZOLE SODIUM 40 MG: 40 TABLET, DELAYED RELEASE ORAL at 11:51

## 2019-07-17 RX ADMIN — LEVALBUTEROL HYDROCHLORIDE 0.63 MG: 0.63 SOLUTION RESPIRATORY (INHALATION) at 00:18

## 2019-07-17 RX ADMIN — HYDROCHLOROTHIAZIDE 25 MG: 25 TABLET ORAL at 11:52

## 2019-07-17 RX ADMIN — DILTIAZEM HYDROCHLORIDE 60 MG: 60 TABLET, FILM COATED ORAL at 12:57

## 2019-07-17 RX ADMIN — ASCORBIC ACID, THIAMINE MONONITRATE,RIBOFLAVIN, NIACINAMIDE, PYRIDOXINE HYDROCHLORIDE, FOLIC ACID, CYANOCOBALAMIN, BIOTIN, CALCIUM PANTOTHENATE, 1 MG: 100; 1.5; 1.7; 20; 10; 1; 6000; 150000; 5 CAPSULE, LIQUID FILLED ORAL at 11:51

## 2019-07-17 RX ADMIN — Medication 10 ML: at 11:52

## 2019-07-17 NOTE — CONSULTS
[facility]    Consult               Today's Date: 7/17/2019  Patient Name: Sage Anderson  Date of admission: 7/16/2019 10:46 AM  Patient's age: 70 y.o., 1950  Admission Dx: Dyspnea [R06.00]    Reason for Admission / Consult:  SOB and abnormal EKG    Requesting Physician: Abida Albarado MD    CHIEF COMPLAINT:  SOB    History Obtained From:  patient    HISTORY OF PRESENT ILLNESS:      The patient is a 70 y.o.  female who is admitted to the hospital for shortness of breath. Patient has baseline shortness of breath due to asthma which has worsened over the past 3-4 weeks and has been associated with coughing. She had a severe asthma attack at a restaurant 3 weeks ago. Her SOB persisted and her PCP sent her to the ER due to an abnormal EKG with frequent PVCs and QTC prolongation of 503. Past Medical History:   has a past medical history of Asthma, Hemoptysis, Hypertension, Lung nodule, Sarcoidosis, and Sleep apnea. Past Surgical History:   has a past surgical history that includes Dilation and curettage of uterus (1978); Hysterectomy (1991); Vulvectomy (2005); eye surgery (Left, 2007); eye surgery (Right, 2013); Abdomen surgery (11/2016); and Colonoscopy (2006 and 2014). Home Medications:    Prior to Admission medications    Medication Sig Start Date End Date Taking?  Authorizing Provider   ranitidine (ZANTAC) 150 MG tablet Take 150 mg by mouth nightly   Yes Historical Provider, MD   benzonatate (TESSALON) 100 MG capsule TAKE 1 CAPSULE BY MOUTH THREE TIMES DAILY AS NEEDED FOR COUGH 3/11/19   Louise Lopez DO   cromolyn (INTAL) 20 MG/2ML nebulizer solution Take 2 mLs by nebulization daily as needed for Allergies 1/21/19   Danii Lopez APRN - CNP   levalbuterol (XOPENEX) 0.63 MG/3ML nebulization USE 1 VIAL VIA NEBULIZER EVERY 8 HOURS AS NEEDED FOR WHEEZING 4/23/18   Mahesh Monique DO   insulin lispro (HUMALOG KWIKPEN) 100 UNIT/ML pen INJECT 5 UNITS BEFORE THE PERRL, no cervical lymphadenopathy. No masses palpable. Normal oral mucosa  Respiratory:  · Prolong expiration, minimal wheezing. Cardiovascular:  · The apical impulse is not displaced  · Heart tones are crisp and normal. regular S1 and S2. SM 2/6 apical area. · Jugular venous pulsation Normal  · The carotid upstroke is normal in amplitude and contour without delay or bruit  · Peripheral pulses are symmetrical and full     Abdomen:   · No masses or tenderness  · Bowel sounds present  Extremities:  ·  No Cyanosis or Clubbing  ·  Lower extremity edema: Yes  ·  Skin: Warm and dry  Neurological:  · Alert and oriented. · Moves all extremities well  · No abnormalities of mood, affect, memory, mentation, or behavior are noted    DATA:    Diagnostics:    EKG: normal sinus rhythm, nonspecific ST and T waves changes, frequent PVC's noted, (prolonged QT which resolved on most recent EKG)  ECHO:  ordered, but not yet obtained. Stress Test: not obtained. Cardiac Angiography: not obtained. Labs:     CBC:   Recent Labs     07/16/19  1100   WBC 7.7   HGB 12.9   HCT 42.2          BMP:   Recent Labs     07/16/19  1100      K 4.1   CO2 24   BUN 14   CREATININE 0.73   LABGLOM >60   GLUCOSE 154*     Pro-BNP:    Recent Labs     07/16/19  1100   PROBNP 321*     BNP: No results for input(s): BNP in the last 72 hours. PT/INR: No results for input(s): PROTIME, INR in the last 72 hours. APTT:No results for input(s): APTT in the last 72 hours. CARDIAC ENZYMES:No results for input(s): CKTOTAL, CKMB, CKMBINDEX, TROPONINI in the last 72 hours. Invalid input(s):  [address]  Recent Labs     07/16/19  1100 07/16/19  1204   TROPONINT NOT REPORTED NOT REPORTED      Recent Labs     07/16/19  1100 07/16/19  1204   TROPHS 12 11     FASTING LIPID PANEL:  Lab Results   Component Value Date    HDL 44 04/14/2015    TRIG 202 04/14/2015     LIVER PROFILE:No results for input(s): AST, ALT, LABALBU in the last 72 hours.       Patient's

## 2019-07-17 NOTE — PROGRESS NOTES
CDU Daily Progress Note  Attending Physician       Pt Name: Dariel Zelaya  MRN: [id number]  [place] 1950  Date of evaluation: 7/17/19    I performed a history and physical examination of the patient and discussed management with the resident. I reviewed the residents note and agree with the documented findings and plan of care. Any areas of disagreement are noted on the chart. I was personally present for the key portions of any procedures. I have documented in the chart those procedures where I was not present during the key portions. I have reviewed the emergency nurses triage note. I agree with the chief complaint, past medical history, past surgical history, allergies, medications, social and family history as documented unless otherwise noted below. Documentation of the HPI, Physical Exam and Medical Decision Making performed by medical students or scribes is based on my personal performance of the HPI, PE and MDM. For Physician Assistant/ Nurse Practitioner cases/documentation I have personally evaluated this patient and have completed at least one if not all key elements of the E/M (history, physical exam, and MDM). Additional findings are as noted. The Family History, Social History and Review of Systems are unchanged from the previous day. No significant events overnight. Fatigue, SOB, exercise intollerance, seen at Dr. Zack Reyna office earlier and told to come to ED. ECG showed bigeminy. Denies chest pain/cough/F/C. PMHx: HTN, dyslipidemia, asthma, tremors. HSTrop 11, PBNP 321 ECG nonspecific w PVCs.  Cards consulted, 2D echo ordered    Stevan Schwartz MD  Attending Physician  Critical Decision Unit

## 2019-07-17 NOTE — H&P
[facility]  CDU / OBSERVATION eNCOUnter  Resident Note     Pt Name: Charles Amor  MRN: [id number]  [place] 1950  Date of evaluation: 7/17/19  Patient's PCP is :  Jing Gutierrez MD    CHIEF COMPLAINT       Chief Complaint   Patient presents with    Cough     sent by Dr. Bhavana Thao for EKG changes. pt went to see Dr. Bhavana Thao due to non productive cough, feeling tired, fatigued.  Fatigue     feeling tired, worn out for the past several weeks. intermittent dizziness. HISTORY OF PRESENT ILLNESS    Sherley Sutton is a 70 y.o. female who presents with acute shortness of breath, acute fatigue, acute exercise intolerance, and new abnormal EKG determined to be bigeminy with QTC prolongation at 503. Patient has baseline shortness of breath which has gotten progressively worse over the past week, accompanied by nonproductive cough. Heart score of 5. Admitted to observation for cardiology evaluation, telemetry monitoring, and ECG reevaluation. Patient is extremely anxious due to significant emotional events related to past stress test.    Location/Symptom: Acute shortness of breath  Timing/Onset: 1 week  Provocation: Exertion  Quality: Intermittent  Radiation: None  Severity: Moderate  Timing/Duration: Progressively worsening over the last week  Modifying Factors:  Worse with exertion, improves with rest    REVIEW OF SYSTEMS       General ROS - No fevers, No malaise   Ophthalmic ROS - No discharge, No changes in vision  ENT ROS -  No sore throat, No rhinorrhea,   Respiratory ROS - shortness of breath, nonproductive cough, no  wheezing  Cardiovascular ROS - No chest pain, no dyspnea on exertion  Gastrointestinal ROS - No abdominal pain, no nausea or vomiting, no change in bowel habits, no black or bloody stools  Genito-Urinary ROS - No dysuria, trouble voiding, or hematuria  Musculoskeletal ROS - No myalgias, No arthalgias  Neurological ROS - No headache, no dizziness/lightheadedness, No focal weakness, no loss of sensation  Dermatological ROS - No lesions, No rash     (PQRS) Advance directives on face sheet per hospital policy. No change unless specifically mentioned in chart    PAST MEDICAL HISTORY    has a past medical history of Asthma, Hemoptysis, Hypertension, Lung nodule, Sarcoidosis, and Sleep apnea. I have reviewed the past medical history with the patient and it is pertinent to this complaint. SURGICAL HISTORY      has a past surgical history that includes Dilation and curettage of uterus (1978); Hysterectomy (1991); Vulvectomy (2005); eye surgery (Left, 2007); eye surgery (Right, 2013); Abdomen surgery (11/2016); and Colonoscopy (2006 and 2014). I have reviewed and agree with Surgical History entered and it is pertinent to this complaint. CURRENT MEDICATIONS         diltiazem (CARDIZEM) tablet 60 mg 3 times per day   pantoprazole (PROTONIX) tablet 40 mg QAM AC   hydrochlorothiazide (HYDRODIURIL) tablet 25 mg Daily   LORazepam (ATIVAN) tablet 0.5 mg Q6H PRN   aspirin EC tablet 81 mg Daily   magnesium oxide (MAG-OX) tablet 400 mg Daily   b complex-C-folic acid (NEPHROCAPS) capsule 1 mg Daily   sodium chloride flush 0.9 % injection 10 mL 2 times per day   sodium chloride flush 0.9 % injection 10 mL PRN   acetaminophen (TYLENOL) tablet 650 mg Q4H PRN   ondansetron (ZOFRAN-ODT) disintegrating tablet 8 mg Q8H PRN   cromolyn (INTAL) nebulizer solution 20 mg Daily PRN   levalbuterol (XOPENEX) nebulization 0.63 mg Q8H PRN       All medication charted and reviewed.     ALLERGIES     is allergic to latex; albuterol; benadryl [diphenhydramine]; dilantin [phenytoin sodium extended]; elavil [amitriptyline hcl]; gabapentin; inderal [propranolol]; keppra [levetiracetam]; lamotrigine; percocet [oxycodone-acetaminophen]; pyridium [phenazopyridine hcl]; spiriva handihaler [tiotropium bromide monohydrate]; tegretol [carbamazepine]; valium [diazepam]; vioxx

## 2019-07-18 LAB
EKG ATRIAL RATE: 267 BPM
EKG ATRIAL RATE: 61 BPM
EKG ATRIAL RATE: 75 BPM
EKG ATRIAL RATE: 76 BPM
EKG ATRIAL RATE: 79 BPM
EKG P AXIS: 36 DEGREES
EKG P AXIS: 43 DEGREES
EKG P AXIS: 53 DEGREES
EKG P AXIS: 56 DEGREES
EKG P AXIS: 61 DEGREES
EKG P-R INTERVAL: 168 MS
EKG P-R INTERVAL: 172 MS
EKG P-R INTERVAL: 174 MS
EKG P-R INTERVAL: 182 MS
EKG Q-T INTERVAL: 416 MS
EKG Q-T INTERVAL: 424 MS
EKG Q-T INTERVAL: 436 MS
EKG Q-T INTERVAL: 436 MS
EKG Q-T INTERVAL: 466 MS
EKG QRS DURATION: 102 MS
EKG QRS DURATION: 104 MS
EKG QRS DURATION: 104 MS
EKG QRS DURATION: 108 MS
EKG QRS DURATION: 94 MS
EKG QTC CALCULATION (BAZETT): 469 MS
EKG QTC CALCULATION (BAZETT): 473 MS
EKG QTC CALCULATION (BAZETT): 490 MS
EKG QTC CALCULATION (BAZETT): 499 MS
EKG QTC CALCULATION (BAZETT): 503 MS
EKG R AXIS: 21 DEGREES
EKG R AXIS: 4 DEGREES
EKG R AXIS: 8 DEGREES
EKG T AXIS: 35 DEGREES
EKG T AXIS: 37 DEGREES
EKG T AXIS: 41 DEGREES
EKG T AXIS: 43 DEGREES
EKG T AXIS: 58 DEGREES
EKG VENTRICULAR RATE: 61 BPM
EKG VENTRICULAR RATE: 75 BPM
EKG VENTRICULAR RATE: 76 BPM
EKG VENTRICULAR RATE: 79 BPM
EKG VENTRICULAR RATE: 88 BPM

## 2019-07-18 PROCEDURE — 93010 ELECTROCARDIOGRAM REPORT: CPT | Performed by: INTERNAL MEDICINE

## 2019-08-15 PROBLEM — Z01.419 PAP SMEAR, LOW-RISK: Status: RESOLVED | Noted: 2017-04-03 | Resolved: 2019-08-15

## 2019-09-09 ENCOUNTER — OFFICE VISIT (OUTPATIENT)
Dept: PULMONOLOGY | Age: 69
End: 2019-09-09
Payer: MEDICARE

## 2019-09-09 VITALS
WEIGHT: 267 LBS | RESPIRATION RATE: 16 BRPM | HEIGHT: 66 IN | HEART RATE: 73 BPM | SYSTOLIC BLOOD PRESSURE: 135 MMHG | OXYGEN SATURATION: 97 % | BODY MASS INDEX: 42.91 KG/M2 | DIASTOLIC BLOOD PRESSURE: 60 MMHG

## 2019-09-09 DIAGNOSIS — J45.909 MILD ASTHMA, UNSPECIFIED WHETHER COMPLICATED, UNSPECIFIED WHETHER PERSISTENT: ICD-10-CM

## 2019-09-09 DIAGNOSIS — I50.42 CHRONIC COMBINED SYSTOLIC AND DIASTOLIC CHF, NYHA CLASS 1 (HCC): ICD-10-CM

## 2019-09-09 DIAGNOSIS — Z99.89 OSA ON CPAP: ICD-10-CM

## 2019-09-09 DIAGNOSIS — G47.33 OSA ON CPAP: ICD-10-CM

## 2019-09-09 DIAGNOSIS — D86.9 SARCOID: ICD-10-CM

## 2019-09-09 DIAGNOSIS — R00.1 BRADYCARDIA: ICD-10-CM

## 2019-09-09 DIAGNOSIS — J45.30 MILD PERSISTENT ASTHMA WITHOUT COMPLICATION: Primary | ICD-10-CM

## 2019-09-09 PROCEDURE — G8427 DOCREV CUR MEDS BY ELIG CLIN: HCPCS | Performed by: INTERNAL MEDICINE

## 2019-09-09 PROCEDURE — 1090F PRES/ABSN URINE INCON ASSESS: CPT | Performed by: INTERNAL MEDICINE

## 2019-09-09 PROCEDURE — 99213 OFFICE O/P EST LOW 20 MIN: CPT | Performed by: INTERNAL MEDICINE

## 2019-09-09 PROCEDURE — 3017F COLORECTAL CA SCREEN DOC REV: CPT | Performed by: INTERNAL MEDICINE

## 2019-09-09 PROCEDURE — G8417 CALC BMI ABV UP PARAM F/U: HCPCS | Performed by: INTERNAL MEDICINE

## 2019-09-09 PROCEDURE — G8400 PT W/DXA NO RESULTS DOC: HCPCS | Performed by: INTERNAL MEDICINE

## 2019-09-09 PROCEDURE — 4040F PNEUMOC VAC/ADMIN/RCVD: CPT | Performed by: INTERNAL MEDICINE

## 2019-09-09 PROCEDURE — G8598 ASA/ANTIPLAT THER USED: HCPCS | Performed by: INTERNAL MEDICINE

## 2019-09-09 PROCEDURE — 1036F TOBACCO NON-USER: CPT | Performed by: INTERNAL MEDICINE

## 2019-09-09 PROCEDURE — 1123F ACP DISCUSS/DSCN MKR DOCD: CPT | Performed by: INTERNAL MEDICINE

## 2019-09-09 RX ORDER — CROMOLYN SODIUM 20 MG/2ML
20 INHALANT INTRABRONCHIAL DAILY PRN
Qty: 240 ML | Refills: 11 | Status: SHIPPED | OUTPATIENT
Start: 2019-09-09 | End: 2020-11-05

## 2019-09-09 ASSESSMENT — SLEEP AND FATIGUE QUESTIONNAIRES
HOW LIKELY ARE YOU TO NOD OFF OR FALL ASLEEP WHEN YOU ARE A PASSENGER IN A CAR FOR AN HOUR WITHOUT A BREAK: 0
ESS TOTAL SCORE: 5
HOW LIKELY ARE YOU TO NOD OFF OR FALL ASLEEP WHILE WATCHING TV: 1
HOW LIKELY ARE YOU TO NOD OFF OR FALL ASLEEP IN A CAR, WHILE STOPPED FOR A FEW MINUTES IN TRAFFIC: 0
HOW LIKELY ARE YOU TO NOD OFF OR FALL ASLEEP WHILE LYING DOWN TO REST IN THE AFTERNOON WHEN CIRCUMSTANCES PERMIT: 2
HOW LIKELY ARE YOU TO NOD OFF OR FALL ASLEEP WHILE SITTING INACTIVE IN A PUBLIC PLACE: 0
HOW LIKELY ARE YOU TO NOD OFF OR FALL ASLEEP WHILE SITTING AND TALKING TO SOMEONE: 0
HOW LIKELY ARE YOU TO NOD OFF OR FALL ASLEEP WHILE SITTING AND READING: 1
HOW LIKELY ARE YOU TO NOD OFF OR FALL ASLEEP WHILE SITTING QUIETLY AFTER LUNCH WITHOUT ALCOHOL: 1

## 2019-09-09 ASSESSMENT — ENCOUNTER SYMPTOMS
SHORTNESS OF BREATH: 1
CHEST TIGHTNESS: 1
GASTROINTESTINAL NEGATIVE: 1
EYES NEGATIVE: 1

## 2019-09-10 NOTE — PROGRESS NOTES
Pill-rolling tremor left hand. Skin: Skin is warm and dry. Nursing note and vitals reviewed. Wt Readings from Last 3 Encounters:   09/09/19 267 lb (121.1 kg)   07/16/19 266 lb (120.7 kg)   07/16/19 270 lb 3.2 oz (122.6 kg)          Results for orders placed or performed during the hospital encounter of 08/29/19   BUN & Creatinine   Result Value Ref Range    BUN 13 8 - 23 mg/dL    CREATININE 0.89 0.50 - 0.90 mg/dL    GFR Non-African American >60 >60 mL/min    GFR African American >60 >60 mL/min    GFR Comment          GFR Staging NOT REPORTED        Assessment:         1. Mild persistent asthma without complication    2. Sarcoid    3. JANIE on CPAP    4. Bradycardia    5. Chronic combined systolic and diastolic CHF, NYHA class 1 ([address]    6. Mild asthma, unspecified whether complicated, unspecified whether persistent          Plan:      1. Proceed with cardiac stress test.  2. Refill cromolyn. 3. Flu shot when available. 4. Continue CPAP. 5. Return in 6 months.      Electronically signed by Brock Vega DO on 9/9/2019at 10:24 PM

## 2019-10-15 ENCOUNTER — OFFICE VISIT (OUTPATIENT)
Dept: PULMONOLOGY | Age: 69
End: 2019-10-15
Payer: MEDICARE

## 2019-10-15 VITALS
DIASTOLIC BLOOD PRESSURE: 85 MMHG | HEIGHT: 66 IN | BODY MASS INDEX: 42.43 KG/M2 | WEIGHT: 264 LBS | OXYGEN SATURATION: 98 % | HEART RATE: 58 BPM | RESPIRATION RATE: 16 BRPM | SYSTOLIC BLOOD PRESSURE: 160 MMHG

## 2019-10-15 DIAGNOSIS — R04.2 HEMOPTYSIS: ICD-10-CM

## 2019-10-15 DIAGNOSIS — D86.9 SARCOID: ICD-10-CM

## 2019-10-15 DIAGNOSIS — Z99.89 OSA ON CPAP: ICD-10-CM

## 2019-10-15 DIAGNOSIS — J20.9 ACUTE BRONCHITIS, UNSPECIFIED ORGANISM: ICD-10-CM

## 2019-10-15 DIAGNOSIS — G47.33 OSA ON CPAP: ICD-10-CM

## 2019-10-15 DIAGNOSIS — J45.30 MILD PERSISTENT ASTHMA WITHOUT COMPLICATION: Primary | ICD-10-CM

## 2019-10-15 PROCEDURE — G8484 FLU IMMUNIZE NO ADMIN: HCPCS | Performed by: NURSE PRACTITIONER

## 2019-10-15 PROCEDURE — G8427 DOCREV CUR MEDS BY ELIG CLIN: HCPCS | Performed by: NURSE PRACTITIONER

## 2019-10-15 PROCEDURE — G8417 CALC BMI ABV UP PARAM F/U: HCPCS | Performed by: NURSE PRACTITIONER

## 2019-10-15 PROCEDURE — 1036F TOBACCO NON-USER: CPT | Performed by: NURSE PRACTITIONER

## 2019-10-15 PROCEDURE — 1123F ACP DISCUSS/DSCN MKR DOCD: CPT | Performed by: NURSE PRACTITIONER

## 2019-10-15 PROCEDURE — G8598 ASA/ANTIPLAT THER USED: HCPCS | Performed by: NURSE PRACTITIONER

## 2019-10-15 PROCEDURE — 3017F COLORECTAL CA SCREEN DOC REV: CPT | Performed by: NURSE PRACTITIONER

## 2019-10-15 PROCEDURE — 4040F PNEUMOC VAC/ADMIN/RCVD: CPT | Performed by: NURSE PRACTITIONER

## 2019-10-15 PROCEDURE — G8400 PT W/DXA NO RESULTS DOC: HCPCS | Performed by: NURSE PRACTITIONER

## 2019-10-15 PROCEDURE — 99214 OFFICE O/P EST MOD 30 MIN: CPT | Performed by: NURSE PRACTITIONER

## 2019-10-15 PROCEDURE — 1090F PRES/ABSN URINE INCON ASSESS: CPT | Performed by: NURSE PRACTITIONER

## 2019-10-15 RX ORDER — PREDNISONE 20 MG/1
40 TABLET ORAL DAILY
Qty: 2 TABLET | Refills: 0 | Status: SHIPPED | OUTPATIENT
Start: 2019-10-15 | End: 2019-10-20

## 2019-10-15 RX ORDER — AZITHROMYCIN 250 MG/1
250 TABLET, FILM COATED ORAL SEE ADMIN INSTRUCTIONS
Qty: 6 TABLET | Refills: 0 | Status: SHIPPED | OUTPATIENT
Start: 2019-10-15 | End: 2019-10-20

## 2019-10-15 ASSESSMENT — ENCOUNTER SYMPTOMS
GASTROINTESTINAL NEGATIVE: 1
EYES NEGATIVE: 1
ALLERGIC/IMMUNOLOGIC NEGATIVE: 1

## 2019-11-04 ENCOUNTER — HOSPITAL ENCOUNTER (OUTPATIENT)
Dept: MAMMOGRAPHY | Facility: CLINIC | Age: 69
Discharge: HOME | End: 2019-11-06
Payer: MEDICARE

## 2019-11-04 DIAGNOSIS — Z12.31 VISIT FOR SCREENING MAMMOGRAM: ICD-10-CM

## 2019-11-04 PROCEDURE — 77067 SCR MAMMO BI INCL CAD: CPT

## 2020-01-15 RX ORDER — BENZONATATE 100 MG/1
CAPSULE ORAL
Qty: 90 CAPSULE | Refills: 0 | Status: SHIPPED | OUTPATIENT
Start: 2020-01-15 | End: 2020-05-01

## 2020-01-15 NOTE — TELEPHONE ENCOUNTER
Dr Jorge Lassiter, patient is current and due in for an appointment on 1/27/20. Per Sonja Verma last dictation patient was previously on this medication and stopped medication and started taking Mucinex twice daily to assist with cough. I am not sure if you are willing to refill or not. Thank you.

## 2020-01-24 ENCOUNTER — TELEPHONE (OUTPATIENT)
Dept: PULMONOLOGY | Age: 70
End: 2020-01-24

## 2020-02-25 ENCOUNTER — OFFICE VISIT (OUTPATIENT)
Dept: PULMONOLOGY | Age: 70
End: 2020-02-25
Payer: MEDICARE

## 2020-02-25 VITALS
SYSTOLIC BLOOD PRESSURE: 157 MMHG | OXYGEN SATURATION: 99 % | BODY MASS INDEX: 41.95 KG/M2 | HEIGHT: 66 IN | WEIGHT: 261 LBS | RESPIRATION RATE: 14 BRPM | DIASTOLIC BLOOD PRESSURE: 70 MMHG | HEART RATE: 76 BPM

## 2020-02-25 PROCEDURE — 1090F PRES/ABSN URINE INCON ASSESS: CPT | Performed by: INTERNAL MEDICINE

## 2020-02-25 PROCEDURE — G8427 DOCREV CUR MEDS BY ELIG CLIN: HCPCS | Performed by: INTERNAL MEDICINE

## 2020-02-25 PROCEDURE — 4040F PNEUMOC VAC/ADMIN/RCVD: CPT | Performed by: INTERNAL MEDICINE

## 2020-02-25 PROCEDURE — 3017F COLORECTAL CA SCREEN DOC REV: CPT | Performed by: INTERNAL MEDICINE

## 2020-02-25 PROCEDURE — G8400 PT W/DXA NO RESULTS DOC: HCPCS | Performed by: INTERNAL MEDICINE

## 2020-02-25 PROCEDURE — 1036F TOBACCO NON-USER: CPT | Performed by: INTERNAL MEDICINE

## 2020-02-25 PROCEDURE — G8484 FLU IMMUNIZE NO ADMIN: HCPCS | Performed by: INTERNAL MEDICINE

## 2020-02-25 PROCEDURE — 1123F ACP DISCUSS/DSCN MKR DOCD: CPT | Performed by: INTERNAL MEDICINE

## 2020-02-25 PROCEDURE — G8417 CALC BMI ABV UP PARAM F/U: HCPCS | Performed by: INTERNAL MEDICINE

## 2020-02-25 PROCEDURE — 99214 OFFICE O/P EST MOD 30 MIN: CPT | Performed by: INTERNAL MEDICINE

## 2020-02-25 ASSESSMENT — ENCOUNTER SYMPTOMS
WHEEZING: 1
GASTROINTESTINAL NEGATIVE: 1
EYES NEGATIVE: 1
SHORTNESS OF BREATH: 1

## 2020-02-25 NOTE — PROGRESS NOTES
General: No scleral icterus. Conjunctiva/sclera: Conjunctivae normal.   Neck:      Musculoskeletal: Neck supple. Thyroid: No thyromegaly. Vascular: No JVD. Trachea: No tracheal deviation. Cardiovascular:      Rate and Rhythm: Normal rate and regular rhythm. Heart sounds: Normal heart sounds. No murmur. No gallop. Pulmonary:      Effort: Pulmonary effort is normal. No respiratory distress. Breath sounds: Wheezing present. No rales. Chest:      Chest wall: No tenderness. Abdominal:      Palpations: Abdomen is soft. Tenderness: There is no abdominal tenderness. Musculoskeletal:      Right lower leg: No edema. Left lower leg: No edema. Lymphadenopathy:      Cervical: No cervical adenopathy. Skin:     General: Skin is warm and dry. Findings: No rash. Neurological:      Mental Status: She is alert and oriented to person, place, and time. Comments: Pill-rolling tremor         Wt Readings from Last 3 Encounters:   02/25/20 261 lb (118.4 kg)   10/15/19 264 lb (119.7 kg)   09/09/19 267 lb (121.1 kg)          Results for orders placed or performed during the hospital encounter of 08/29/19   BUN & Creatinine   Result Value Ref Range    BUN 13 8 - 23 mg/dL    CREATININE 0.89 0.50 - 0.90 mg/dL    GFR Non-African American >60 >60 mL/min    GFR African American >60 >60 mL/min    GFR Comment          GFR Staging NOT REPORTED        Assessment:         1. JANIE on CPAP    2. Sarcoid    3. Mild persistent asthma without complication    4. Morbid obesity ([address]    5. Bradycardia    6. Resting tremor          Plan:      1. Adjustment of Cardizem per primary care physician. 2. Change auto titrating CPAP 4-16 cm water pressure. 3. Weight loss. Will help asthma and sleep apnea. 4. Avoid sedative hypnotics and alcohol at bedtime. 5. Discussed strategies for management of asthma. 6. Return in 1 year.      Electronically signed by Mortimer Silversmith, DO on 2/25/2020at 11:28 AM

## 2020-05-01 RX ORDER — BENZONATATE 100 MG/1
CAPSULE ORAL
Qty: 90 CAPSULE | Refills: 1 | Status: SHIPPED | OUTPATIENT
Start: 2020-05-01 | End: 2021-07-29 | Stop reason: SDUPTHER

## 2020-11-05 NOTE — TELEPHONE ENCOUNTER
Dr Gabriela Nina, patient is current and due in for an appointment on 2/15/21. Per last dictation patient is on this medication. Please sign for refill if ok. Thank you.

## 2020-11-06 RX ORDER — CROMOLYN SODIUM 20 MG/2ML
INHALANT INTRABRONCHIAL
Qty: 240 ML | Refills: 3 | Status: SHIPPED | OUTPATIENT
Start: 2020-11-06 | End: 2021-11-16

## 2020-11-10 ENCOUNTER — TELEPHONE (OUTPATIENT)
Dept: PULMONOLOGY | Age: 70
End: 2020-11-10

## 2020-11-10 NOTE — TELEPHONE ENCOUNTER
The patient called and stated that she needs new supplies for her CPAP machine if agree please sign.       Order faxed to [phone]

## 2020-12-29 RX ORDER — BENZONATATE 100 MG/1
100 CAPSULE ORAL 3 TIMES DAILY PRN
Qty: 90 CAPSULE | Refills: 1 | Status: SHIPPED | OUTPATIENT
Start: 2020-12-29 | End: 2021-01-28

## 2021-01-02 ENCOUNTER — HOSPITAL ENCOUNTER (OUTPATIENT)
Dept: MAMMOGRAPHY | Age: 71
Discharge: HOME | End: 2021-01-04
Payer: MEDICARE

## 2021-01-02 DIAGNOSIS — Z12.31 VISIT FOR SCREENING MAMMOGRAM: ICD-10-CM

## 2021-01-02 PROCEDURE — 77063 BREAST TOMOSYNTHESIS BI: CPT

## 2021-06-14 ENCOUNTER — OFFICE VISIT (OUTPATIENT)
Dept: PULMONOLOGY | Age: 71
End: 2021-06-14
Payer: MEDICARE

## 2021-06-14 VITALS
TEMPERATURE: 97.8 F | DIASTOLIC BLOOD PRESSURE: 76 MMHG | HEART RATE: 86 BPM | OXYGEN SATURATION: 95 % | HEIGHT: 66 IN | BODY MASS INDEX: 40.53 KG/M2 | WEIGHT: 252.2 LBS | RESPIRATION RATE: 14 BRPM | SYSTOLIC BLOOD PRESSURE: 135 MMHG

## 2021-06-14 DIAGNOSIS — J45.909 MILD ASTHMA, UNSPECIFIED WHETHER COMPLICATED, UNSPECIFIED WHETHER PERSISTENT: ICD-10-CM

## 2021-06-14 DIAGNOSIS — J12.9 VIRAL PNEUMONIA, UNSPECIFIED: ICD-10-CM

## 2021-06-14 DIAGNOSIS — J45.30 MILD PERSISTENT ASTHMA WITHOUT COMPLICATION: ICD-10-CM

## 2021-06-14 DIAGNOSIS — E66.01 CLASS 3 SEVERE OBESITY DUE TO EXCESS CALORIES WITH SERIOUS COMORBIDITY AND BODY MASS INDEX (BMI) OF 40.0 TO 44.9 IN ADULT (HCC): ICD-10-CM

## 2021-06-14 DIAGNOSIS — G47.33 OSA ON CPAP: Primary | ICD-10-CM

## 2021-06-14 DIAGNOSIS — Z99.89 OSA ON CPAP: Primary | ICD-10-CM

## 2021-06-14 PROCEDURE — G8417 CALC BMI ABV UP PARAM F/U: HCPCS | Performed by: INTERNAL MEDICINE

## 2021-06-14 PROCEDURE — 3017F COLORECTAL CA SCREEN DOC REV: CPT | Performed by: INTERNAL MEDICINE

## 2021-06-14 PROCEDURE — 1123F ACP DISCUSS/DSCN MKR DOCD: CPT | Performed by: INTERNAL MEDICINE

## 2021-06-14 PROCEDURE — 1036F TOBACCO NON-USER: CPT | Performed by: INTERNAL MEDICINE

## 2021-06-14 PROCEDURE — 1090F PRES/ABSN URINE INCON ASSESS: CPT | Performed by: INTERNAL MEDICINE

## 2021-06-14 PROCEDURE — 4040F PNEUMOC VAC/ADMIN/RCVD: CPT | Performed by: INTERNAL MEDICINE

## 2021-06-14 PROCEDURE — 99213 OFFICE O/P EST LOW 20 MIN: CPT | Performed by: INTERNAL MEDICINE

## 2021-06-14 PROCEDURE — G8400 PT W/DXA NO RESULTS DOC: HCPCS | Performed by: INTERNAL MEDICINE

## 2021-06-14 PROCEDURE — G8427 DOCREV CUR MEDS BY ELIG CLIN: HCPCS | Performed by: INTERNAL MEDICINE

## 2021-06-14 RX ORDER — FUROSEMIDE 20 MG/1
20 TABLET ORAL 2 TIMES DAILY
COMMUNITY
End: 2021-09-24 | Stop reason: CLARIF

## 2021-06-14 RX ORDER — POTASSIUM CHLORIDE 1.5 G/1.77G
20 POWDER, FOR SOLUTION ORAL 2 TIMES DAILY
COMMUNITY

## 2021-06-14 ASSESSMENT — ENCOUNTER SYMPTOMS
APNEA: 1
EYES NEGATIVE: 1
GASTROINTESTINAL NEGATIVE: 1

## 2021-06-14 NOTE — PROGRESS NOTES
Subjective:      Patient ID: Gwynn Felty is a 69 y.o. female being seen in my clinic for   Chief Complaint   Patient presents with    Sleep Apnea     On CPAP        HPI  Follow-up visit for asthma and sleep apnea. Since her visit a year ago she states that her asthma is been under good control. \"I have never felt this good. \"  Unfortunately, she reports that last winter she had a severe viral illness that was diagnosed with pneumonia. She claims that this was prior to the onset of Covid pandemic in [facility] ([facility] 2020) but her test reportedly was negative. Chest x-ray was done at [facility]. Results are not available. Patient still has not received her Covid vaccination. She is ambivalent relative to her previous episode of Guillain-Barré. Its not clear whether she believes this was related to a flu shot. The patient's CPAP machine is no longer functioning. Her compliance download is not available. States that she uses CPAP every night for the entire night. Reports refreshing and restorative sleep. Denies excessive daytime sleepiness. Feels that she is benefiting greatly. Needs new mask tubing and supplies. Her last polysomnogram was done [place] [address] [facility] in [facility]. AHI 15.    Review of Systems   Constitutional: Negative. HENT: Negative. Eyes: Negative. Respiratory: Positive for apnea. Cardiovascular: Negative. Gastrointestinal: Negative. Neurological: Positive for seizures. Psychiatric/Behavioral: The patient is nervous/anxious. All other systems reviewed and are negative.       Objective:     Vitals:    06/14/21 1437   BP: 135/76   Pulse: 86   Resp: 14   Temp: 97.8 °F (36.6 °C)   TempSrc: Temporal   SpO2: 95%  Comment: Room air at rest   Weight: 252 lb 3.2 oz (114.4 kg)   Height: 5' 6\" (1.676 m)     Current Outpatient Medications   Medication Sig Dispense Refill    furosemide (LASIX) 20 MG tablet Take 20 mg by mouth 2 times daily      potassium chloride (KLOR-CON) 20 MEQ packet Take 20 mEq by mouth 2 times daily      cromolyn (INTAL) 20 MG/2ML nebulizer solution USE 1 VIAL VIA NEBULIZER DAILY AS NEEDED FOR ALLERGIES 240 mL 3    benzonatate (TESSALON) 100 MG capsule TAKE 1 CAPSULE BY MOUTH THREE TIMES DAILY AS NEEDED FOR COUGH 90 capsule 1    diltiazem (CARDIZEM) 60 MG tablet Take 1 tablet by mouth every 8 hours (Patient taking differently: Take 180 mg by mouth daily ) 90 tablet 0    levalbuterol (XOPENEX) 0.63 MG/3ML nebulization USE 1 VIAL VIA NEBULIZER EVERY 8 HOURS AS NEEDED FOR WHEEZING 90 vial 9    insulin lispro (HUMALOG KWIKPEN) 100 UNIT/ML pen INJECT 5 UNITS BEFORE THE LARGEST MEAL OF THE DAY      insulin detemir (LEVEMIR FLEXTOUCH) 100 UNIT/ML injection pen INJECT 1800 UNIT 2 tmes DAILY      Flaxseed, Linseed, (FLAXSEED OIL PO) Take by mouth      b complex vitamins capsule Take 1 capsule by mouth daily      omeprazole (PRILOSEC) 40 MG delayed release capsule Take 40 mg by mouth daily      hydrochlorothiazide (HYDRODIURIL) 25 MG tablet Take 25 mg by mouth daily      LORazepam (ATIVAN) 0.5 MG tablet Take 0.5 mg by mouth every 6 hours as needed for Anxiety      aspirin 81 MG tablet Take 81 mg by mouth daily      fexofenadine (HM FEXOFENADINE HCL) 180 MG tablet Take 180 mg by mouth daily       magnesium (MAGNESIUM-OXIDE) 250 MG TABS tablet Take 250 mg by mouth daily       No current facility-administered medications for this visit. Physical Exam  Vitals and nursing note reviewed. Constitutional:       Appearance: She is well-developed. She is obese. HENT:      Head: Normocephalic. Eyes:      General: No scleral icterus. Conjunctiva/sclera: Conjunctivae normal.   Neck:      Thyroid: No thyromegaly. Vascular: No JVD. Trachea: No tracheal deviation. Cardiovascular:      Rate and Rhythm: Normal rate and regular rhythm. Heart sounds: Normal heart sounds. No murmur heard.    No gallop. Pulmonary:      Effort: Pulmonary effort is normal. No respiratory distress. Breath sounds: No wheezing or rales. Chest:      Chest wall: No tenderness. Abdominal:      Palpations: Abdomen is soft. Tenderness: There is no abdominal tenderness. Musculoskeletal:      Cervical back: Neck supple. Lymphadenopathy:      Cervical: No cervical adenopathy. Skin:     General: Skin is warm and dry. Neurological:      Mental Status: She is alert and oriented to person, place, and time. Wt Readings from Last 3 Encounters:   06/14/21 252 lb 3.2 oz (114.4 kg)   02/25/20 261 lb (118.4 kg)   10/15/19 264 lb (119.7 kg)     Results for orders placed or performed during the hospital encounter of 08/29/19   BUN & Creatinine   Result Value Ref Range    BUN 13 8 - 23 mg/dL    CREATININE 0.89 0.50 - 0.90 mg/dL    GFR Non-African American >60 >60 mL/min    GFR African American >60 >60 mL/min    GFR Comment          GFR Staging NOT REPORTED        :      1. JANIE on CPAP    2. Viral pneumonia, unspecified    3. Mild persistent asthma without complication    4. Mild asthma, unspecified whether complicated, unspecified whether persistent    5. Class 3 severe obesity due to excess calories with serious comorbidity and body mass index (BMI) of 40.0 to 44.9 in adult [facility])      Patient Active Problem List   Diagnosis    Age-related cataract    Achilles tendinitis    Adrenal hyperplasia ([address]    Allergic rhinitis    Bradycardia    Bursitis    Candidiasis of mouth    Cellulitis of lip    Contusion of knee    Diabetes mellitus ([address]    Epileptic seizure ([address]    Essential hypertension    Menopause present    Numbness of hand    Sarcoidosis    Seizure ([address]    Upper respiratory infection    Vulvar dystrophy    Dyspnea    Bigeminy         Plan:      1. New CPAP auto titrating 4-16 cm water pressure with mask, tubing, and supplies. 2. Weight loss.   3. Avoid sedative hypnotics and alcohol at bedtime. 4. Discussed strategies for management of asthma. No refills required. 5. COVID-19 antibody testing. Advised patient that this test does not completely exclude Covid infection (may not stimulate antibody response that is measured) however patient states that it will help her make a decision relative to vaccination. 6. Strongly encouraged her to follow through with Covid vaccination. 7. Return in 1 year. No orders of the defined types were placed in this encounter. Orders Placed This Encounter   Procedures    Covid-19, Antibody, Total     Standing Status:   Future     Standing Expiration Date:   6/14/2022     Scheduling Instructions:      CDC does not recommend using antibody testing to diagnose acute infection. It is recommended to use a direct viral detection test to diagnose acute infection. (COVID-19 EA [id number])            Antibody tests are not 100% accurate, and some false-positive results or false-negative results may occur. A positive result may not ensure immunity from reinfection. Per CDC, positive or negative results do not confirm whether a patient is able to spread the virus that causes COVID-19     Order Specific Question:   Symptomatic for COVID-19 as defined by CDC? Answer:   Yes     Order Specific Question:   Date of Symptom Onset     Answer:   1/14/2020     Order Specific Question:   Hospitalized for COVID-19? Answer:   No     Order Specific Question:   Admitted to ICU for COVID-19? Answer:   No     Order Specific Question:   Employed in healthcare setting? Answer:   No     Order Specific Question:   Resident in a congregate (group) care setting? Answer:   No     Order Specific Question:   Pregnant?      Answer:   No    DME Order for CPAP as OP     CPAP Auto Adjust 4 - 16 cm H2O    Heated Humidifier    Tubing 1 per 3 months    Full Face Mask 1 per 3 months and Cushions/Seals 1 per month    Headgear 1 per 6 months, Chin Strap 1 per 6 months, Disposable Filters 2 per month, Non-disposable Filters 1 per 6 months, Chambers 1 per 6 months and Other Related Supplies. Replace supplies and accessories as needed. Patient may choose another interface for compliance and comfort. Comments: replacement machine; PSG 2012  Diagnosis: JANIE  Length of need: Lifetime     Return in about 1 year (around 6/14/2022).        Electronically signed by Rajeev Dorado DO on 6/14/2021at 3:19 PM

## 2021-06-17 DIAGNOSIS — J12.9 VIRAL PNEUMONIA, UNSPECIFIED: ICD-10-CM

## 2021-07-29 DIAGNOSIS — R05.3 CHRONIC COUGH: Primary | ICD-10-CM

## 2021-07-29 RX ORDER — BENZONATATE 100 MG/1
100 CAPSULE ORAL 3 TIMES DAILY PRN
Qty: 90 CAPSULE | Refills: 1 | Status: SHIPPED | OUTPATIENT
Start: 2021-07-29 | End: 2021-12-09

## 2021-09-24 ENCOUNTER — OFFICE VISIT (OUTPATIENT)
Dept: PULMONOLOGY | Age: 71
End: 2021-09-24
Payer: MEDICARE

## 2021-09-24 VITALS
BODY MASS INDEX: 40.34 KG/M2 | SYSTOLIC BLOOD PRESSURE: 153 MMHG | HEART RATE: 62 BPM | HEIGHT: 66 IN | TEMPERATURE: 98.6 F | OXYGEN SATURATION: 95 % | WEIGHT: 251 LBS | DIASTOLIC BLOOD PRESSURE: 78 MMHG

## 2021-09-24 DIAGNOSIS — J45.30 MILD PERSISTENT ASTHMA WITHOUT COMPLICATION: ICD-10-CM

## 2021-09-24 DIAGNOSIS — E66.01 CLASS 3 SEVERE OBESITY DUE TO EXCESS CALORIES WITH SERIOUS COMORBIDITY AND BODY MASS INDEX (BMI) OF 40.0 TO 44.9 IN ADULT (HCC): ICD-10-CM

## 2021-09-24 DIAGNOSIS — G47.33 OSA ON CPAP: Primary | ICD-10-CM

## 2021-09-24 DIAGNOSIS — Z99.89 OSA ON CPAP: Primary | ICD-10-CM

## 2021-09-24 PROCEDURE — 1123F ACP DISCUSS/DSCN MKR DOCD: CPT | Performed by: INTERNAL MEDICINE

## 2021-09-24 PROCEDURE — G8417 CALC BMI ABV UP PARAM F/U: HCPCS | Performed by: INTERNAL MEDICINE

## 2021-09-24 PROCEDURE — G8400 PT W/DXA NO RESULTS DOC: HCPCS | Performed by: INTERNAL MEDICINE

## 2021-09-24 PROCEDURE — 3017F COLORECTAL CA SCREEN DOC REV: CPT | Performed by: INTERNAL MEDICINE

## 2021-09-24 PROCEDURE — 99213 OFFICE O/P EST LOW 20 MIN: CPT | Performed by: INTERNAL MEDICINE

## 2021-09-24 PROCEDURE — 1036F TOBACCO NON-USER: CPT | Performed by: INTERNAL MEDICINE

## 2021-09-24 PROCEDURE — 4040F PNEUMOC VAC/ADMIN/RCVD: CPT | Performed by: INTERNAL MEDICINE

## 2021-09-24 PROCEDURE — 1090F PRES/ABSN URINE INCON ASSESS: CPT | Performed by: INTERNAL MEDICINE

## 2021-09-24 PROCEDURE — G8427 DOCREV CUR MEDS BY ELIG CLIN: HCPCS | Performed by: INTERNAL MEDICINE

## 2021-09-24 RX ORDER — FUROSEMIDE 20 MG/1
TABLET ORAL
COMMUNITY

## 2021-09-24 ASSESSMENT — SLEEP AND FATIGUE QUESTIONNAIRES
HOW LIKELY ARE YOU TO NOD OFF OR FALL ASLEEP WHILE WATCHING TV: 0
HOW LIKELY ARE YOU TO NOD OFF OR FALL ASLEEP WHEN YOU ARE A PASSENGER IN A CAR FOR AN HOUR WITHOUT A BREAK: 0
HOW LIKELY ARE YOU TO NOD OFF OR FALL ASLEEP WHILE LYING DOWN TO REST IN THE AFTERNOON WHEN CIRCUMSTANCES PERMIT: 0
HOW LIKELY ARE YOU TO NOD OFF OR FALL ASLEEP IN A CAR, WHILE STOPPED FOR A FEW MINUTES IN TRAFFIC: 0
HOW LIKELY ARE YOU TO NOD OFF OR FALL ASLEEP WHILE SITTING AND READING: 0
HOW LIKELY ARE YOU TO NOD OFF OR FALL ASLEEP WHILE SITTING INACTIVE IN A PUBLIC PLACE: 0
HOW LIKELY ARE YOU TO NOD OFF OR FALL ASLEEP WHILE SITTING QUIETLY AFTER LUNCH WITHOUT ALCOHOL: 0
HOW LIKELY ARE YOU TO NOD OFF OR FALL ASLEEP WHILE SITTING AND TALKING TO SOMEONE: 0
ESS TOTAL SCORE: 0

## 2021-09-24 ASSESSMENT — ENCOUNTER SYMPTOMS
EYES NEGATIVE: 1
APNEA: 1
GASTROINTESTINAL NEGATIVE: 1

## 2021-09-24 NOTE — PROGRESS NOTES
Subjective:      Patient ID: Royal Cordero is a 69 y.o. female being seen in my clinic for   Chief Complaint   Patient presents with    Follow-up     new cpap machine    Sleep Apnea       HPI  Patient returns for follow up of sleep apnea. Pt has been compliant with CPAP and uses it every night for the entire night. Denies snoring, disturbed sleep or excessive daytime sleepiness. Having no particular difficulty with the apparatus. Compliance download not available. Patient expresses Covid vaccine hesitancy. Stressed importance. Patient states she will reconsider. Review of Systems   Constitutional: Negative. HENT: Negative. Eyes: Negative. Respiratory: Positive for apnea. Cardiovascular: Negative. Gastrointestinal: Negative. Musculoskeletal: Positive for gait problem. All other systems reviewed and are negative. Objective:     Vitals:    09/24/21 1521   BP: (!) 153/78   Pulse: 62   Temp: 98.6 °F (37 °C)   SpO2: 95%   Weight: 251 lb (113.9 kg)   Height: 5' 6\" (1.676 m)     Current Outpatient Medications   Medication Sig Dispense Refill    furosemide (LASIX) 20 MG tablet Lasix 20 mg tablet   Take 1 tablet every day by oral route.       benzonatate (TESSALON) 100 MG capsule Take 1 capsule by mouth 3 times daily as needed for Cough 90 capsule 1    potassium chloride (KLOR-CON) 20 MEQ packet Take 20 mEq by mouth 2 times daily      cromolyn (INTAL) 20 MG/2ML nebulizer solution USE 1 VIAL VIA NEBULIZER DAILY AS NEEDED FOR ALLERGIES 240 mL 3    diltiazem (CARDIZEM) 60 MG tablet Take 1 tablet by mouth every 8 hours (Patient taking differently: Take 180 mg by mouth daily ) 90 tablet 0    levalbuterol (XOPENEX) 0.63 MG/3ML nebulization USE 1 VIAL VIA NEBULIZER EVERY 8 HOURS AS NEEDED FOR WHEEZING 90 vial 9    insulin lispro (HUMALOG KWIKPEN) 100 UNIT/ML pen INJECT 5 UNITS BEFORE THE LARGEST MEAL OF THE DAY      insulin detemir (LEVEMIR FLEXTOUCH) 100 UNIT/ML injection pen INJECT 1800 UNIT 2 tmes DAILY      Flaxseed, Linseed, (FLAXSEED OIL PO) Take by mouth      b complex vitamins capsule Take 1 capsule by mouth daily      omeprazole (PRILOSEC) 40 MG delayed release capsule Take 40 mg by mouth daily      hydrochlorothiazide (HYDRODIURIL) 25 MG tablet Take 25 mg by mouth daily      LORazepam (ATIVAN) 0.5 MG tablet Take 0.5 mg by mouth every 6 hours as needed for Anxiety      aspirin 81 MG tablet Take 81 mg by mouth daily      fexofenadine (HM FEXOFENADINE HCL) 180 MG tablet Take 180 mg by mouth daily       magnesium (MAGNESIUM-OXIDE) 250 MG TABS tablet Take 250 mg by mouth daily       No current facility-administered medications for this visit. Physical Exam  Vitals and nursing note reviewed. Constitutional:       Appearance: She is well-developed. She is obese. HENT:      Head: Normocephalic. Eyes:      General: No scleral icterus. Conjunctiva/sclera: Conjunctivae normal.   Neck:      Thyroid: No thyromegaly. Vascular: No JVD. Trachea: No tracheal deviation. Cardiovascular:      Rate and Rhythm: Normal rate and regular rhythm. Heart sounds: Normal heart sounds. No murmur heard. No gallop. Pulmonary:      Effort: Pulmonary effort is normal. No respiratory distress. Breath sounds: No wheezing or rales. Chest:      Chest wall: No tenderness. Abdominal:      Palpations: Abdomen is soft. Tenderness: There is no abdominal tenderness. Musculoskeletal:      Cervical back: Neck supple. Lymphadenopathy:      Cervical: No cervical adenopathy. Skin:     General: Skin is warm and dry. Neurological:      Mental Status: She is alert and oriented to person, place, and time.        Wt Readings from Last 3 Encounters:   09/24/21 251 lb (113.9 kg)   06/14/21 252 lb 3.2 oz (114.4 kg)   02/25/20 261 lb (118.4 kg)     Results for orders placed or performed during the hospital encounter of 08/29/19   BUN & Creatinine   Result Value Ref Range

## 2021-10-12 ENCOUNTER — TELEPHONE (OUTPATIENT)
Dept: PULMONOLOGY | Age: 71
End: 2021-10-12

## 2021-10-12 RX ORDER — LEVALBUTEROL INHALATION SOLUTION 0.63 MG/3ML
0.63 SOLUTION RESPIRATORY (INHALATION) EVERY 4 HOURS PRN
Qty: 120 EACH | Refills: 11 | Status: SHIPPED | OUTPATIENT
Start: 2021-10-12

## 2021-10-12 NOTE — TELEPHONE ENCOUNTER
Pt current- called office requesting Rx refill. Her PCP has always filled in the past, however he just retired. She is asking if you will fill. Xopenx 0.63 mg per 3 ml. - please verify pending Rx and sign.

## 2021-11-16 DIAGNOSIS — J45.909 MILD ASTHMA, UNSPECIFIED WHETHER COMPLICATED, UNSPECIFIED WHETHER PERSISTENT: ICD-10-CM

## 2021-11-16 DIAGNOSIS — J45.30 MILD PERSISTENT ASTHMA WITHOUT COMPLICATION: ICD-10-CM

## 2021-11-16 RX ORDER — CROMOLYN SODIUM 20 MG/2ML
INHALANT INTRABRONCHIAL
Qty: 180 ML | Refills: 2 | Status: SHIPPED | OUTPATIENT
Start: 2021-11-16 | End: 2022-07-06

## 2021-12-09 DIAGNOSIS — R05.3 CHRONIC COUGH: ICD-10-CM

## 2021-12-09 RX ORDER — BENZONATATE 100 MG/1
CAPSULE ORAL
Qty: 90 CAPSULE | Refills: 2 | Status: SHIPPED | OUTPATIENT
Start: 2021-12-09 | End: 2022-03-07

## 2021-12-09 NOTE — TELEPHONE ENCOUNTER
Dr Maxx Kulkarni, patient is current and is scheduled for follow up on 6/20/22. Medication last prescribed on 7/29/21 for 30 days with one refill. Please sign for refill if ok. Thank you.

## 2022-03-05 DIAGNOSIS — R05.3 CHRONIC COUGH: ICD-10-CM

## 2022-03-07 RX ORDER — BENZONATATE 100 MG/1
CAPSULE ORAL
Qty: 90 CAPSULE | Refills: 3 | Status: SHIPPED | OUTPATIENT
Start: 2022-03-07

## 2022-03-07 NOTE — TELEPHONE ENCOUNTER
LAST VISIT: 9/24/21  LAST VISIT: 6/20/22    Last prescribed 12/9/21 with two refills. Please sign for refill if ok. Thank you.

## 2022-03-22 ENCOUNTER — HOSPITAL ENCOUNTER (OUTPATIENT)
Dept: MAMMOGRAPHY | Age: 72
Discharge: HOME | End: 2022-03-24
Payer: MEDICARE

## 2022-03-22 DIAGNOSIS — Z12.31 VISIT FOR SCREENING MAMMOGRAM: ICD-10-CM

## 2022-03-22 PROCEDURE — 77063 BREAST TOMOSYNTHESIS BI: CPT

## 2022-04-01 ENCOUNTER — TELEPHONE (OUTPATIENT)
Dept: PULMONOLOGY | Age: 72
End: 2022-04-01

## 2022-04-01 DIAGNOSIS — J45.909 MILD ASTHMA, UNSPECIFIED WHETHER COMPLICATED, UNSPECIFIED WHETHER PERSISTENT: Primary | ICD-10-CM

## 2022-04-01 NOTE — TELEPHONE ENCOUNTER
Patient called and left me a voice mail in regards to needing a script for neb supplies. Tried to call patient back x2 phone just rang busy. Will send order to [facility].

## 2022-04-11 NOTE — TELEPHONE ENCOUNTER
Patient called back. Needs order for new nebulizer now as well as supplies.  Sending to the number she provided [phone]

## 2022-07-05 DIAGNOSIS — J45.30 MILD PERSISTENT ASTHMA WITHOUT COMPLICATION: ICD-10-CM

## 2022-07-05 DIAGNOSIS — J45.909 MILD ASTHMA, UNSPECIFIED WHETHER COMPLICATED, UNSPECIFIED WHETHER PERSISTENT: ICD-10-CM

## 2022-07-06 NOTE — TELEPHONE ENCOUNTER
LAST VISIT: 11/16/21  NEXT VISIT: 7/11/22 with NP Vanessa Acosta    No mention of this medication in your last dictation but you have prescribed in the past. Please sign for refill if ok. Thank you.

## 2022-07-07 RX ORDER — CROMOLYN SODIUM 20 MG/2ML
INHALANT INTRABRONCHIAL
Qty: 180 ML | Refills: 0 | Status: SHIPPED | OUTPATIENT
Start: 2022-07-07 | End: 2022-08-30

## 2022-08-29 DIAGNOSIS — J45.30 MILD PERSISTENT ASTHMA WITHOUT COMPLICATION: ICD-10-CM

## 2022-08-29 DIAGNOSIS — J45.909 MILD ASTHMA, UNSPECIFIED WHETHER COMPLICATED, UNSPECIFIED WHETHER PERSISTENT: ICD-10-CM

## 2022-08-30 RX ORDER — CROMOLYN SODIUM 20 MG/2ML
INHALANT INTRABRONCHIAL
Qty: 180 ML | Refills: 0 | Status: SHIPPED | OUTPATIENT
Start: 2022-08-30

## 2022-08-30 NOTE — TELEPHONE ENCOUNTER
LAST VISIT: 9/24/21  NEXT VISIT: 9/30/22    No mention of this medication in your last dictation but you have prescribed in the past. Please sign for refill if ok. Thank you.

## 2022-12-16 ENCOUNTER — OFFICE VISIT (OUTPATIENT)
Dept: PULMONOLOGY | Age: 72
End: 2022-12-16
Payer: MEDICARE

## 2022-12-16 VITALS
HEIGHT: 66 IN | BODY MASS INDEX: 38.06 KG/M2 | SYSTOLIC BLOOD PRESSURE: 154 MMHG | DIASTOLIC BLOOD PRESSURE: 77 MMHG | RESPIRATION RATE: 16 BRPM | HEART RATE: 75 BPM | OXYGEN SATURATION: 97 % | WEIGHT: 236.8 LBS

## 2022-12-16 DIAGNOSIS — G47.33 OSA ON CPAP: Primary | ICD-10-CM

## 2022-12-16 DIAGNOSIS — E66.01 CLASS 2 SEVERE OBESITY DUE TO EXCESS CALORIES WITH SERIOUS COMORBIDITY AND BODY MASS INDEX (BMI) OF 38.0 TO 38.9 IN ADULT (HCC): ICD-10-CM

## 2022-12-16 DIAGNOSIS — S42.401S CLOSED FRACTURE OF DISTAL END OF RIGHT HUMERUS, UNSPECIFIED FRACTURE MORPHOLOGY, SEQUELA: ICD-10-CM

## 2022-12-16 DIAGNOSIS — R05.3 CHRONIC COUGH: ICD-10-CM

## 2022-12-16 DIAGNOSIS — Z99.89 OSA ON CPAP: Primary | ICD-10-CM

## 2022-12-16 DIAGNOSIS — J45.909 MILD ASTHMA, UNSPECIFIED WHETHER COMPLICATED, UNSPECIFIED WHETHER PERSISTENT: ICD-10-CM

## 2022-12-16 DIAGNOSIS — J45.30 MILD PERSISTENT ASTHMA WITHOUT COMPLICATION: ICD-10-CM

## 2022-12-16 DIAGNOSIS — G20 PARKINSON DISEASE (HCC): ICD-10-CM

## 2022-12-16 PROCEDURE — 3017F COLORECTAL CA SCREEN DOC REV: CPT | Performed by: INTERNAL MEDICINE

## 2022-12-16 PROCEDURE — G8417 CALC BMI ABV UP PARAM F/U: HCPCS | Performed by: INTERNAL MEDICINE

## 2022-12-16 PROCEDURE — G8400 PT W/DXA NO RESULTS DOC: HCPCS | Performed by: INTERNAL MEDICINE

## 2022-12-16 PROCEDURE — 99214 OFFICE O/P EST MOD 30 MIN: CPT | Performed by: INTERNAL MEDICINE

## 2022-12-16 PROCEDURE — 1123F ACP DISCUSS/DSCN MKR DOCD: CPT | Performed by: INTERNAL MEDICINE

## 2022-12-16 PROCEDURE — G8484 FLU IMMUNIZE NO ADMIN: HCPCS | Performed by: INTERNAL MEDICINE

## 2022-12-16 PROCEDURE — E1399 DURABLE MEDICAL EQUIPMENT MI: HCPCS | Performed by: INTERNAL MEDICINE

## 2022-12-16 PROCEDURE — 3078F DIAST BP <80 MM HG: CPT | Performed by: INTERNAL MEDICINE

## 2022-12-16 PROCEDURE — 3074F SYST BP LT 130 MM HG: CPT | Performed by: INTERNAL MEDICINE

## 2022-12-16 PROCEDURE — 1036F TOBACCO NON-USER: CPT | Performed by: INTERNAL MEDICINE

## 2022-12-16 PROCEDURE — 1090F PRES/ABSN URINE INCON ASSESS: CPT | Performed by: INTERNAL MEDICINE

## 2022-12-16 PROCEDURE — G8427 DOCREV CUR MEDS BY ELIG CLIN: HCPCS | Performed by: INTERNAL MEDICINE

## 2022-12-16 RX ORDER — CROMOLYN SODIUM 20 MG/2ML
20 INHALANT INTRABRONCHIAL 3 TIMES DAILY
Qty: 180 ML | Refills: 11 | Status: SHIPPED | OUTPATIENT
Start: 2022-12-16

## 2022-12-16 RX ORDER — LEVALBUTEROL INHALATION SOLUTION 0.63 MG/3ML
0.63 SOLUTION RESPIRATORY (INHALATION) EVERY 4 HOURS PRN
Qty: 120 EACH | Refills: 11 | Status: SHIPPED | OUTPATIENT
Start: 2022-12-16

## 2022-12-16 RX ORDER — BENZONATATE 100 MG/1
100 CAPSULE ORAL 3 TIMES DAILY PRN
Qty: 90 CAPSULE | Refills: 3 | Status: SHIPPED | OUTPATIENT
Start: 2022-12-16

## 2022-12-16 ASSESSMENT — ENCOUNTER SYMPTOMS
RESPIRATORY NEGATIVE: 1
EYES NEGATIVE: 1
GASTROINTESTINAL NEGATIVE: 1

## 2022-12-16 NOTE — PATIENT INSTRUCTIONS
Faxing order and office note to [address] [place] [address]  Printed AVS for patient.   Wyline Cockayne

## 2022-12-16 NOTE — PROGRESS NOTES
Subjective:      Patient ID: Reagan Blair is a 66 y.o. female being seen in my clinic for   Chief Complaint   Patient presents with    1 Year Follow Up       HPI  Patient returns for follow up of sleep apnea and asthma. Since last visit 12 months ago, patient has been very compliant with CPAP. Uses every night, for the entire night. Reports refreshing and restorative sleep. Denies snoring. Reports normal daytime alertness. Believes benefiting greatly. Compliance download from 9/16/22 to 12/14/22 shows 72% compliance for average of 7.1hrs per night. Residual AHI 0.9. Mean vzvycles67/4 cm H2O. actually, the patient is 100% compliant however in late October she fell and had a complex fracture of her right humerus. She was unable to sleep in bed for a month. She is now facing complete immobilization for 12 weeks. Patient is quite distraught. In addition, she was diagnosed by Dr. Eual Canavan, during her hospitalization at [facility], with definite Parkinson's disease. She reports symptoms of bradykinesia and resting tremor. Likely implicated in her fall. Asthma is under good control with cromolyn and levalbuterol. Uses Tessalon Perles as needed cough. Denies shortness of breath although not very active. Patient declines both flu shot and COVID vaccination due to previous episode of Guillain-Barré. Review of Systems   Constitutional: Negative. HENT: Negative. Eyes: Negative. Respiratory: Negative. Cardiovascular: Negative. Gastrointestinal: Negative. Musculoskeletal:  Positive for arthralgias and gait problem. Neurological:  Positive for tremors. Bell's palsy. All other systems reviewed and are negative.     Objective:     Vitals:    12/16/22 1519   BP: (!) 154/77   Pulse: 75   Resp: 16   SpO2: 97%   Weight: 236 lb 12.8 oz (107.4 kg)   Height: 5' 6\" (1.676 m)     Current Outpatient Medications   Medication Sig Dispense Refill    benzonatate (TESSALON) 100 MG capsule Take 1 capsule by mouth 3 times daily as needed for Cough 90 capsule 3    cromolyn (INTAL) 20 MG/2ML nebulizer solution Take 2 mLs by nebulization 3 times daily 180 mL 11    levalbuterol (XOPENEX) 0.63 MG/3ML nebulization Take 3 mLs by nebulization every 4 hours as needed for Wheezing or Shortness of Breath 120 each 11    furosemide (LASIX) 20 MG tablet Lasix 20 mg tablet   Take 1 tablet every day by oral route. potassium chloride (KLOR-CON) 20 MEQ packet Take 20 mEq by mouth 2 times daily      diltiazem (CARDIZEM) 60 MG tablet Take 1 tablet by mouth every 8 hours (Patient taking differently: Take 180 mg by mouth daily ) 90 tablet 0    insulin lispro (HUMALOG KWIKPEN) 100 UNIT/ML pen INJECT 5 UNITS BEFORE THE LARGEST MEAL OF THE DAY      insulin detemir (LEVEMIR FLEXTOUCH) 100 UNIT/ML injection pen INJECT 1800 UNIT 2 tmes DAILY      Flaxseed, Linseed, (FLAXSEED OIL PO) Take by mouth      b complex vitamins capsule Take 1 capsule by mouth daily      omeprazole (PRILOSEC) 40 MG delayed release capsule Take 40 mg by mouth daily      hydrochlorothiazide (HYDRODIURIL) 25 MG tablet Take 25 mg by mouth daily      LORazepam (ATIVAN) 0.5 MG tablet Take 0.5 mg by mouth every 6 hours as needed for Anxiety      aspirin 81 MG tablet Take 81 mg by mouth daily      fexofenadine (HM FEXOFENADINE HCL) 180 MG tablet Take 180 mg by mouth daily       magnesium (MAGNESIUM-OXIDE) 250 MG TABS tablet Take 250 mg by mouth daily       No current facility-administered medications for this visit. Physical Exam  Vitals and nursing note reviewed. Constitutional:       Appearance: She is well-developed. She is obese. HENT:      Head: Normocephalic. Nose: Nose normal. No congestion. Mouth/Throat:      Mouth: Mucous membranes are moist.      Pharynx: Oropharynx is clear. No oropharyngeal exudate. Eyes:      General: No scleral icterus. Conjunctiva/sclera: Conjunctivae normal.   Neck:      Thyroid: No thyromegaly. Vascular: No JVD. Trachea: No tracheal deviation. Cardiovascular:      Rate and Rhythm: Normal rate and regular rhythm. Heart sounds: Normal heart sounds. No murmur heard. No gallop. Pulmonary:      Effort: Pulmonary effort is normal. No respiratory distress. Breath sounds: No wheezing or rales. Chest:      Chest wall: No tenderness. Abdominal:      Palpations: Abdomen is soft. Tenderness: There is no abdominal tenderness. Musculoskeletal:         General: Tenderness and deformity present. Cervical back: Neck supple. Right lower leg: No edema. Left lower leg: No edema. Comments: Right humerus. Lymphadenopathy:      Cervical: No cervical adenopathy. Skin:     General: Skin is warm and dry. Neurological:      Mental Status: She is alert and oriented to person, place, and time. Comments: Resting tremor. Right facial droop. Wt Readings from Last 3 Encounters:   12/16/22 236 lb 12.8 oz (107.4 kg)   09/24/21 251 lb (113.9 kg)   06/14/21 252 lb 3.2 oz (114.4 kg)     Results for orders placed or performed during the hospital encounter of 08/29/19   BUN & Creatinine   Result Value Ref Range    BUN 13 8 - 23 mg/dL    Creatinine 0.89 0.50 - 0.90 mg/dL    GFR Non-African American >60 >60 mL/min    GFR African American >60 >60 mL/min    GFR Comment          GFR Staging NOT REPORTED        :      1. JANIE on CPAP    2. Mild asthma, unspecified whether complicated, unspecified whether persistent    3. Class 2 severe obesity due to excess calories with serious comorbidity and body mass index (BMI) of 38.0 to 38.9 in adult ([facility])    4. Parkinson disease ([facility] [address]    5. Closed fracture of distal end of right humerus, unspecified fracture morphology, sequela    6. Chronic cough    7.  Mild persistent asthma without complication      Patient Active Problem List   Diagnosis    Age-related cataract    Achilles tendinitis    Adrenal hyperplasia ([facility])    Allergic rhinitis Bradycardia    Bursitis    Candidiasis of mouth    Cellulitis of lip    Contusion of knee    Diabetes mellitus ([address]    Epileptic seizure ([facility] [address]    Essential hypertension    Menopause present    Numbness of hand    Sarcoidosis    Seizure ([facility] [address]    Upper respiratory infection    Vulvar dystrophy    Dyspnea    Bigeminy         Plan:      Encourage CPAP use   Avoid sedative hypnotics and alcohol at bedtime  Weight loss  Exercise caution when driving and other high risk activities of not adequately treated for sleep apnea  Instructed to bring CPAP machine for use post op  New CPAP mask, tubing, and supplies. Refilled asthma medications. Discussed strategies for management of asthma including escalation and de-escalation of therapy. Return in 1 year. Orders Placed This Encounter   Medications    benzonatate (TESSALON) 100 MG capsule     Sig: Take 1 capsule by mouth 3 times daily as needed for Cough     Dispense:  90 capsule     Refill:  3    cromolyn (INTAL) 20 MG/2ML nebulizer solution     Sig: Take 2 mLs by nebulization 3 times daily     Dispense:  180 mL     Refill:  11     DX: J45.30    levalbuterol (XOPENEX) 0.63 MG/3ML nebulization     Sig: Take 3 mLs by nebulization every 4 hours as needed for Wheezing or Shortness of Breath     Dispense:  120 each     Refill:  11     Orders Placed This Encounter   Procedures    [facility] DURABLE MEDICAL EQUIPMENT [facility]     New CPAP mask tubing and supplies. Return in about 1 year (around 12/16/2023).        Electronically signed by Abigail Montalvo DO on 12/16/2022at 4:26 PM

## 2022-12-19 ENCOUNTER — TELEPHONE (OUTPATIENT)
Dept: PULMONOLOGY | Age: 72
End: 2022-12-19

## 2022-12-19 DIAGNOSIS — J45.30 MILD PERSISTENT ASTHMA WITHOUT COMPLICATION: Primary | ICD-10-CM

## 2022-12-19 DIAGNOSIS — G20 PARKINSON DISEASE (HCC): ICD-10-CM

## 2022-12-19 NOTE — TELEPHONE ENCOUNTER
Patient called stating that he handicap placard expires in January and was wondering if you could sign off on a new one. I pended the script if you approve. Only DX I could find is Asthma.   Will mail to patient if script is signed

## 2023-01-31 ENCOUNTER — TELEPHONE (OUTPATIENT)
Dept: PULMONOLOGY | Age: 73
End: 2023-01-31

## 2023-01-31 DIAGNOSIS — J45.30 MILD PERSISTENT ASTHMA WITHOUT COMPLICATION: Primary | ICD-10-CM

## 2023-01-31 DIAGNOSIS — J45.30 MILD PERSISTENT ASTHMA WITHOUT COMPLICATION: ICD-10-CM

## 2023-01-31 DIAGNOSIS — J45.909 MILD ASTHMA, UNSPECIFIED WHETHER COMPLICATED, UNSPECIFIED WHETHER PERSISTENT: ICD-10-CM

## 2023-01-31 NOTE — TELEPHONE ENCOUNTER
Patient called asking for supplies for her nebulizer machine. I wrote up the order.  Please sign it and I'll fax it to her DME company

## 2023-02-01 RX ORDER — CROMOLYN SODIUM 20 MG/2ML
INHALANT INTRABRONCHIAL
Qty: 180 ML | Refills: 11 | OUTPATIENT
Start: 2023-02-01

## 2023-02-01 NOTE — TELEPHONE ENCOUNTER
I made a mistake and a nebulizer machine was ordered. Patient just needs the tubing, mouthpiece and supplies. I re-wrote the order, please sign it and I'll fax it over.  Thank you

## 2023-02-06 ENCOUNTER — TELEPHONE (OUTPATIENT)
Dept: PULMONOLOGY | Age: 73
End: 2023-02-06

## 2023-02-06 NOTE — TELEPHONE ENCOUNTER
RECEIVED A PA NOTICE FROM [facility] FOR CROMOLYN. I PHONED [facility] SPOKE WITH OMEGA HE STATED PT HAS SCRIPT TRANSFERRED TO Nayana Maldonado. PA WILL NOT BE DONE FOR THIS PHARMACY BECAUSE IT SHOULD BE FILED UNDER MEDICARE PART \"B\".

## 2023-02-10 ENCOUNTER — TELEPHONE (OUTPATIENT)
Dept: PULMONOLOGY | Age: 73
End: 2023-02-10

## 2023-02-10 NOTE — TELEPHONE ENCOUNTER
Called pharmacy spoke with Mari Friedman informed her that they need to file this medication under Medicare part \"B\" not Medicare part \"D\". Faye processed it while on the phone and it went through insurance just fine.

## 2023-02-10 NOTE — TELEPHONE ENCOUNTER
Received another PA notice for cromolyn (INTAL) 20 MG/2ML nebulizer solution   Via [employer] website. This is being processed with [employer].

## 2023-03-09 ENCOUNTER — TELEPHONE (OUTPATIENT)
Dept: PULMONOLOGY | Age: 73
End: 2023-03-09

## 2023-03-09 NOTE — TELEPHONE ENCOUNTER
RECEIVED a fax for a PA on Cromolyn and I phoned [facility] spoke with Angella Hector informed him that they are billing it wrong. That this should be billed under Medicare part \"B\" NOT  Medicare part \"D\". Angella Hector processed it through [employer] \"B\" and was processed w/o a PA.

## 2023-03-23 DIAGNOSIS — J45.30 MILD PERSISTENT ASTHMA WITHOUT COMPLICATION: ICD-10-CM

## 2023-03-23 DIAGNOSIS — J45.909 MILD ASTHMA, UNSPECIFIED WHETHER COMPLICATED, UNSPECIFIED WHETHER PERSISTENT: ICD-10-CM

## 2023-03-24 RX ORDER — CROMOLYN SODIUM 20 MG/2ML
INHALANT INTRABRONCHIAL
OUTPATIENT
Start: 2023-03-24

## 2023-03-29 ENCOUNTER — TELEPHONE (OUTPATIENT)
Dept: PULMONOLOGY | Age: 73
End: 2023-03-29

## 2024-01-26 ENCOUNTER — TELEPHONE (OUTPATIENT)
Dept: PULMONOLOGY | Age: 74
End: 2024-01-26

## 2024-01-26 DIAGNOSIS — J45.41 MODERATE PERSISTENT ASTHMA WITH EXACERBATION: Primary | ICD-10-CM

## 2024-01-26 RX ORDER — AZITHROMYCIN 250 MG/1
250 TABLET, FILM COATED ORAL SEE ADMIN INSTRUCTIONS
Qty: 6 TABLET | Refills: 0 | Status: SHIPPED | OUTPATIENT
Start: 2024-01-26 | End: 2024-01-31

## 2024-01-26 RX ORDER — PREDNISONE 20 MG/1
40 TABLET ORAL DAILY
Qty: 10 TABLET | Refills: 0 | Status: SHIPPED | OUTPATIENT
Start: 2024-01-26 | End: 2024-01-31

## 2024-01-26 NOTE — TELEPHONE ENCOUNTER
Patient called and stated that she feels like she is getting bronchitis again.  She stated that it feel like there is a hunk of mucus stuck in the middle of her chest and she can't cough it up.  She is also experiencing SOB and has requested a Zpak and possible prednisone if you feel like it will help her.  I did pend both last written scripts by our office.  Patient was last seen 12/16/23 next appt 2/26/24.  Please sign if you agree

## 2024-02-26 ENCOUNTER — OFFICE VISIT (OUTPATIENT)
Dept: PULMONOLOGY | Age: 74
End: 2024-02-26
Payer: MEDICARE

## 2024-02-26 VITALS
SYSTOLIC BLOOD PRESSURE: 129 MMHG | WEIGHT: 244 LBS | RESPIRATION RATE: 12 BRPM | HEART RATE: 70 BPM | DIASTOLIC BLOOD PRESSURE: 66 MMHG | TEMPERATURE: 97.3 F | HEIGHT: 66 IN | BODY MASS INDEX: 39.21 KG/M2 | OXYGEN SATURATION: 95 %

## 2024-02-26 DIAGNOSIS — G47.33 OSA ON CPAP: Primary | ICD-10-CM

## 2024-02-26 DIAGNOSIS — E66.01 CLASS 2 SEVERE OBESITY DUE TO EXCESS CALORIES WITH SERIOUS COMORBIDITY AND BODY MASS INDEX (BMI) OF 39.0 TO 39.9 IN ADULT (HCC): ICD-10-CM

## 2024-02-26 DIAGNOSIS — R05.3 CHRONIC COUGH: ICD-10-CM

## 2024-02-26 DIAGNOSIS — J45.30 MILD PERSISTENT ASTHMA WITHOUT COMPLICATION: ICD-10-CM

## 2024-02-26 DIAGNOSIS — Z63.79 STRESS DUE TO ILLNESS OF FAMILY MEMBER: ICD-10-CM

## 2024-02-26 DIAGNOSIS — J45.909 MILD ASTHMA, UNSPECIFIED WHETHER COMPLICATED, UNSPECIFIED WHETHER PERSISTENT: ICD-10-CM

## 2024-02-26 PROCEDURE — G8427 DOCREV CUR MEDS BY ELIG CLIN: HCPCS | Performed by: INTERNAL MEDICINE

## 2024-02-26 PROCEDURE — 3017F COLORECTAL CA SCREEN DOC REV: CPT | Performed by: INTERNAL MEDICINE

## 2024-02-26 PROCEDURE — G8400 PT W/DXA NO RESULTS DOC: HCPCS | Performed by: INTERNAL MEDICINE

## 2024-02-26 PROCEDURE — 1090F PRES/ABSN URINE INCON ASSESS: CPT | Performed by: INTERNAL MEDICINE

## 2024-02-26 PROCEDURE — G8417 CALC BMI ABV UP PARAM F/U: HCPCS | Performed by: INTERNAL MEDICINE

## 2024-02-26 PROCEDURE — 99214 OFFICE O/P EST MOD 30 MIN: CPT | Performed by: INTERNAL MEDICINE

## 2024-02-26 PROCEDURE — 1036F TOBACCO NON-USER: CPT | Performed by: INTERNAL MEDICINE

## 2024-02-26 PROCEDURE — 1123F ACP DISCUSS/DSCN MKR DOCD: CPT | Performed by: INTERNAL MEDICINE

## 2024-02-26 PROCEDURE — 3074F SYST BP LT 130 MM HG: CPT | Performed by: INTERNAL MEDICINE

## 2024-02-26 PROCEDURE — G8484 FLU IMMUNIZE NO ADMIN: HCPCS | Performed by: INTERNAL MEDICINE

## 2024-02-26 PROCEDURE — 3078F DIAST BP <80 MM HG: CPT | Performed by: INTERNAL MEDICINE

## 2024-02-26 RX ORDER — LEVALBUTEROL INHALATION SOLUTION 0.63 MG/3ML
0.63 SOLUTION RESPIRATORY (INHALATION) EVERY 4 HOURS PRN
Qty: 120 EACH | Refills: 11 | Status: SHIPPED | OUTPATIENT
Start: 2024-02-26

## 2024-02-26 RX ORDER — CROMOLYN SODIUM 20 MG/2ML
20 INHALANT INTRABRONCHIAL 3 TIMES DAILY
Qty: 180 ML | Refills: 11 | Status: CANCELLED | OUTPATIENT
Start: 2024-02-26

## 2024-02-26 RX ORDER — CROMOLYN SODIUM 20 MG/2ML
20 INHALANT INTRABRONCHIAL 3 TIMES DAILY
Qty: 180 ML | Refills: 11 | Status: SHIPPED | OUTPATIENT
Start: 2024-02-26

## 2024-02-26 RX ORDER — BENZONATATE 100 MG/1
100 CAPSULE ORAL 3 TIMES DAILY PRN
Qty: 90 CAPSULE | Refills: 11 | Status: SHIPPED | OUTPATIENT
Start: 2024-02-26

## 2024-02-26 RX ORDER — AZITHROMYCIN 250 MG/1
TABLET, FILM COATED ORAL
Qty: 6 TABLET | Refills: 0 | Status: SHIPPED | OUTPATIENT
Start: 2024-02-26 | End: 2024-03-07

## 2024-02-26 ASSESSMENT — SLEEP AND FATIGUE QUESTIONNAIRES
ESS TOTAL SCORE: 2
HOW LIKELY ARE YOU TO NOD OFF OR FALL ASLEEP IN A CAR, WHILE STOPPED FOR A FEW MINUTES IN TRAFFIC: 0
HOW LIKELY ARE YOU TO NOD OFF OR FALL ASLEEP WHILE WATCHING TV: 1
HOW LIKELY ARE YOU TO NOD OFF OR FALL ASLEEP WHEN YOU ARE A PASSENGER IN A CAR FOR AN HOUR WITHOUT A BREAK: 0
HOW LIKELY ARE YOU TO NOD OFF OR FALL ASLEEP WHILE SITTING AND TALKING TO SOMEONE: 0
HOW LIKELY ARE YOU TO NOD OFF OR FALL ASLEEP WHILE SITTING AND READING: 1
HOW LIKELY ARE YOU TO NOD OFF OR FALL ASLEEP WHILE SITTING INACTIVE IN A PUBLIC PLACE: 0
HOW LIKELY ARE YOU TO NOD OFF OR FALL ASLEEP WHILE LYING DOWN TO REST IN THE AFTERNOON WHEN CIRCUMSTANCES PERMIT: 0
HOW LIKELY ARE YOU TO NOD OFF OR FALL ASLEEP WHILE SITTING QUIETLY AFTER LUNCH WITHOUT ALCOHOL: 0

## 2024-02-26 ASSESSMENT — ENCOUNTER SYMPTOMS
SHORTNESS OF BREATH: 1
EYES NEGATIVE: 1
GASTROINTESTINAL NEGATIVE: 1
COUGH: 1

## 2024-02-26 NOTE — PROGRESS NOTES
persistent    3. Mild persistent asthma without complication    4. Chronic cough    5. Stress due to illness of family member    6. Class 2 severe obesity due to excess calories with serious comorbidity and body mass index (BMI) of 39.0 to 39.9 in adult ([facility])      Patient Active Problem List   Diagnosis    Age-related cataract    Achilles tendinitis    Adrenal hyperplasia (H[facility])    Allergic rhinitis    Bradycardia    Bursitis    Candidiasis of mouth    Cellulitis of lip    Contusion of knee    Diabetes mellitus ([facility])    Epileptic seizure ([facility])    Essential hypertension    Menopause present    Numbness of hand    Sarcoidosis    Seizure ([facility])    Upper respiratory infection    Vulvar dystrophy    Dyspnea    Bigeminy         Plan:    Encourage CPAP use   New mask, tubing, and supplies.  Avoid sedative hypnotics and alcohol at bedtime  Weight loss  Exercise caution when driving and other high risk activities of not adequately treated for sleep apnea  Instructed to bring CPAP machine for use post op  Refilled bronchodilators.  Azithromycin Z-Tan for purulent exacerbation.  Return in 1 year.    Orders Placed This Encounter   Medications    benzonatate (TESSALON) 100 MG capsule     Sig: Take 1 capsule by mouth 3 times daily as needed for Cough     Dispense:  90 capsule     Refill:  11    cromolyn (INTAL) 20 MG/2ML nebulizer solution     Sig: Take 2 mLs by nebulization 3 times daily     Dispense:  180 mL     Refill:  11     DX: J45.30    levalbuterol (XOPENEX) 0.63 MG/3ML nebulization     Sig: Take 3 mLs by nebulization every 4 hours as needed for Wheezing or Shortness of Breath     Dispense:  120 each     Refill:  11    azithromycin (ZITHROMAX) 250 MG tablet     Simg on day 1 followed by 250mg on days 2 - 5     Dispense:  6 tablet     Refill:  0     Orders Placed This Encounter   Procedures    [facility] DURABLE MEDICAL EQUIPMENT [facility]     New CPAP mask tubing and supplies.     Return in about 1 year (around

## 2024-02-27 ENCOUNTER — TELEPHONE (OUTPATIENT)
Dept: PULMONOLOGY | Age: 74
End: 2024-02-27

## 2024-02-27 NOTE — TELEPHONE ENCOUNTER
Received a PA notice for cromolyn (INTAL) 20 MG/2ML nebulizer solution .  This is in the process with nelly

## 2024-02-27 NOTE — TELEPHONE ENCOUNTER
This was denied under Medicare part \"D\"     Called [facility] spoke with PIETRO informed her that she needs to process this medication through Medicare part \"B\" not part \"D\".  Pietro took pt's medicare \"B\" information and stated medicare is down right now but is should be ok.

## 2024-03-12 ENCOUNTER — TELEPHONE (OUTPATIENT)
Dept: PULMONOLOGY | Age: 74
End: 2024-03-12

## 2024-03-12 NOTE — TELEPHONE ENCOUNTER
Patient called expressing frustration that her DME orders for her and her  were never sent to [facility].  Her chart states [facility] is [facility].  She states she informed us at check out previously that she changed companies. Writer updated chart note that [employer] is DME.  Faxed order for nebulizer and CPAP supplies to [facility].

## 2024-03-25 ENCOUNTER — TELEPHONE (OUTPATIENT)
Dept: PULMONOLOGY | Age: 74
End: 2024-03-25

## 2024-03-25 DIAGNOSIS — J45.30 MILD PERSISTENT ASTHMA WITHOUT COMPLICATION: Primary | ICD-10-CM

## 2024-03-25 NOTE — TELEPHONE ENCOUNTER
Called [facility]- Spoke to Salima to confirm what exactly is needed for patient.  They need an update nebulizer order.  Old order has .  Please review and sign if accepted.  Thank you.

## 2024-03-25 NOTE — TELEPHONE ENCOUNTER
Salima from [facility] called needs a new script for nebulizer kit faxed to [phone],pt last seen 2/26/2024

## 2024-06-02 NOTE — PROGRESS NOTES
weight loss goals   Decrease your risk for heart disease    What should your goals be?  The goal is to have regular physical activity combined with dietary changes that lead to stable weight loss  You should try to have a weight-loss goal that is 5-10 % of the your body weight.    What type of exercise should you do?  People who engage in both aerobic and muscle-strengthening forms of exercise are likely to attain the greatest benefit.  Aerobic exercise:   Examples: brisk walking (3 miles per hour or faster), jogging, cycling, yoga, Water aerobics, Bicycling slower than 10 miles per hour, Tennis (doubles), Ballroom dancing, General gardening  As a rule of thumb, a person doing moderate-intensity aerobic activity should be able to talk, but not sing, during the activity  Muscle-strengthening: Strength or resistance training 2 to 3 days per week on nonconsecutive days, targeting all major muscle groups.  About strength training:   Muscle-strengthening activities are beneficial if they work the major muscle groups of the body: the legs, hips, back, chest, abdomen, shoulders, and arms.  When resistance training is used to enhance muscle strength, one set of 8 to 12 repetitions of each exercise is effective, although two or three sets may be more effective.  Examples: working with resistance bands; doing calisthenics that use body weight for resistance, such as push-ups, pull-ups, and sit-ups; carrying heavy loads; and heavy gardening, such as digging or hoeing. See example instructions: Resistance Training With Free Weights  Resistance/strength training can include things like free weights.   Performing muscle-strengthening activity two to three times per week     How often should you exercise?  Goal is to exercise 150 minutes/week, spread over at least 3 days per week, with no more than 2 consecutive days without exercise.  Example: At least 30 minutes for 5 days in the week  [place] durations (minimum 75 min/week) of

## 2024-06-05 ENCOUNTER — OFFICE VISIT (OUTPATIENT)
Age: 74
End: 2024-06-05
Payer: MEDICARE

## 2024-06-05 VITALS
BODY MASS INDEX: 41.25 KG/M2 | HEIGHT: 65 IN | DIASTOLIC BLOOD PRESSURE: 53 MMHG | WEIGHT: 247.6 LBS | SYSTOLIC BLOOD PRESSURE: 127 MMHG | HEART RATE: 69 BPM | TEMPERATURE: 98.2 F | RESPIRATION RATE: 18 BRPM

## 2024-06-05 DIAGNOSIS — R10.9 ABDOMINAL DISCOMFORT: ICD-10-CM

## 2024-06-05 DIAGNOSIS — K59.09 CHRONIC CONSTIPATION: ICD-10-CM

## 2024-06-05 DIAGNOSIS — E11.9 TYPE 2 DIABETES MELLITUS WITHOUT COMPLICATION, WITHOUT LONG-TERM CURRENT USE OF INSULIN (HCC): Primary | ICD-10-CM

## 2024-06-05 DIAGNOSIS — Z91.81 AT HIGH RISK FOR FALLS: ICD-10-CM

## 2024-06-05 PROCEDURE — 99212 OFFICE O/P EST SF 10 MIN: CPT

## 2024-06-05 PROCEDURE — 1090F PRES/ABSN URINE INCON ASSESS: CPT

## 2024-06-05 PROCEDURE — G8400 PT W/DXA NO RESULTS DOC: HCPCS

## 2024-06-05 PROCEDURE — 3074F SYST BP LT 130 MM HG: CPT

## 2024-06-05 PROCEDURE — G8417 CALC BMI ABV UP PARAM F/U: HCPCS

## 2024-06-05 PROCEDURE — 3046F HEMOGLOBIN A1C LEVEL >9.0%: CPT

## 2024-06-05 PROCEDURE — 99204 OFFICE O/P NEW MOD 45 MIN: CPT

## 2024-06-05 PROCEDURE — 2022F DILAT RTA XM EVC RTNOPTHY: CPT

## 2024-06-05 PROCEDURE — 1036F TOBACCO NON-USER: CPT

## 2024-06-05 PROCEDURE — 3078F DIAST BP <80 MM HG: CPT

## 2024-06-05 PROCEDURE — G8427 DOCREV CUR MEDS BY ELIG CLIN: HCPCS

## 2024-06-05 PROCEDURE — 1123F ACP DISCUSS/DSCN MKR DOCD: CPT

## 2024-06-05 PROCEDURE — 3017F COLORECTAL CA SCREEN DOC REV: CPT

## 2024-06-05 RX ORDER — CARBIDOPA AND LEVODOPA 25; 100 MG/1; MG/1
2 TABLET, EXTENDED RELEASE ORAL 3 TIMES DAILY
COMMUNITY
Start: 2022-10-31

## 2024-06-05 RX ORDER — GUAIFENESIN AND DEXTROMETHORPHAN HYDROBROMIDE 20; 400 MG/1; MG/1
1 TABLET ORAL DAILY
COMMUNITY

## 2024-06-05 RX ORDER — DOXYCYCLINE 50 MG/1
50 CAPSULE ORAL
COMMUNITY
Start: 2024-05-28 | End: 2024-06-27

## 2024-06-05 RX ORDER — DILTIAZEM HYDROCHLORIDE 180 MG/1
180 CAPSULE, EXTENDED RELEASE ORAL DAILY
COMMUNITY

## 2024-06-05 RX ORDER — POTASSIUM CHLORIDE 750 MG/1
20 CAPSULE, EXTENDED RELEASE ORAL DAILY
COMMUNITY

## 2024-06-05 RX ORDER — POLYETHYLENE GLYCOL 3350 17 G/17G
17 POWDER, FOR SOLUTION ORAL DAILY
COMMUNITY

## 2024-06-05 RX ORDER — VALACYCLOVIR HYDROCHLORIDE 500 MG/1
500 TABLET, FILM COATED ORAL DAILY
COMMUNITY
Start: 2024-04-16

## 2024-06-05 RX ORDER — EZETIMIBE 10 MG/1
10 TABLET ORAL DAILY
COMMUNITY
Start: 2023-04-03

## 2024-06-05 SDOH — ECONOMIC STABILITY: FOOD INSECURITY: WITHIN THE PAST 12 MONTHS, THE FOOD YOU BOUGHT JUST DIDN'T LAST AND YOU DIDN'T HAVE MONEY TO GET MORE.: NEVER TRUE

## 2024-06-05 SDOH — ECONOMIC STABILITY: FOOD INSECURITY: WITHIN THE PAST 12 MONTHS, YOU WORRIED THAT YOUR FOOD WOULD RUN OUT BEFORE YOU GOT MONEY TO BUY MORE.: NEVER TRUE

## 2024-06-05 SDOH — ECONOMIC STABILITY: HOUSING INSECURITY
IN THE LAST 12 MONTHS, WAS THERE A TIME WHEN YOU DID NOT HAVE A STEADY PLACE TO SLEEP OR SLEPT IN A SHELTER (INCLUDING NOW)?: NO

## 2024-06-05 SDOH — ECONOMIC STABILITY: INCOME INSECURITY: HOW HARD IS IT FOR YOU TO PAY FOR THE VERY BASICS LIKE FOOD, HOUSING, MEDICAL CARE, AND HEATING?: NOT HARD AT ALL

## 2024-06-05 NOTE — PATIENT INSTRUCTIONS
Thank you for following up with us at [facility] outpatient residency clinic! It was a pleasure to meet you today!     Today we discussed the below as next steps in your care:  Parkinsons disease is associated with constipation, diarrhea and gut issues. You currently take fiber and 1 packet of Mirilax. We recommend taken 1 fiber supplement twice a day. Which means your current fiber is not enough.   Diabetes lab workup, abdominal ultrasound and workup since you are noting abdominal discomfort.     LIFESTYLE CHANGES TO HELP IMPROVE YOUR HEALTH      HOW CAN YOU IMPROVE YOUR PHYSICAL ACTIVITY?    Physical activity helps    Improve your sensitivity to insulin and control of blood sugar if you're a diabetic  Lower your cholesterol and blood pressure  You meet your weight loss goals   Decrease your risk for heart disease    What should your goals be?  The goal is to have regular physical activity combined with dietary changes that lead to stable weight loss  You should try to have a weight-loss goal that is 5-10 % of the your body weight.    What type of exercise should you do?  People who engage in both aerobic and muscle-strengthening forms of exercise are likely to attain the greatest benefit.  Aerobic exercise:   Examples: brisk walking (3 miles per hour or faster), jogging, cycling, yoga, Water aerobics, Bicycling slower than 10 miles per hour, Tennis (doubles), Ballroom dancing, General gardening  As a rule of thumb, a person doing moderate-intensity aerobic activity should be able to talk, but not sing, during the activity  Muscle-strengthening: Strength or resistance training 2 to 3 days per week on nonconsecutive days, targeting all major muscle groups.  About strength training:   Muscle-strengthening activities are beneficial if they work the major muscle groups of the body: the legs, hips, back, chest, abdomen, shoulders, and arms.  When resistance training is used to enhance muscle

## 2024-06-06 PROBLEM — I50.22 CHRONIC SYSTOLIC (CONGESTIVE) HEART FAILURE (HCC): Status: ACTIVE | Noted: 2024-06-06

## 2024-06-06 PROBLEM — G20.A1 IDIOPATHIC PARKINSON'S DISEASE (HCC): Status: ACTIVE | Noted: 2024-06-06

## 2024-06-06 PROBLEM — R56.9 SEIZURE-LIKE ACTIVITY (HCC): Chronic | Status: ACTIVE | Noted: 2023-01-22

## 2024-06-06 PROBLEM — J45.20 MILD INTERMITTENT ASTHMA, UNCOMPLICATED: Status: ACTIVE | Noted: 2023-11-01

## 2024-06-06 PROBLEM — K59.09 CHRONIC CONSTIPATION: Status: ACTIVE | Noted: 2024-06-06

## 2024-06-06 PROBLEM — G47.33 OSA (OBSTRUCTIVE SLEEP APNEA): Status: ACTIVE | Noted: 2024-06-06

## 2024-06-06 NOTE — ASSESSMENT & PLAN NOTE
Sherley Reed is a new patient that wanted to discuss management of her DM type 2 . he is doing well. Denies any hypoglycemic events or complications. No new symptoms.  Will get the labs noted below and use results to determine next steps regarding medication management.  Her 10-year ASCVD risk score (James JIMENEZ, et al., 2019) is: 33.6% however she declines getting a statin because of the fact that she notes that her family has a strong history of getting really bad muscle cramps on statins.  So instead after discussion with her physician prior she was started on ezetimibe.  We will continue to discuss when her labs result.  Questions/concerns answered. Patient verbalized and expressed understanding.

## 2024-06-06 NOTE — ASSESSMENT & PLAN NOTE
Patient has a history of an abscess that is required drainage in the past and has been experiencing abdominal discomfort in the same area where the abscess was.  There are some concerns for whether or not the abscess has returned as a little tenderness was noted in the right lower quadrant on examination.  It is also possible however that her discomfort/constipation and gut issues are related to her Parkinson's disease.  We also therefore recommended that she increase her fiber intake and start using MiraLAX.  There is a possibility of contribution of unmanaged diabetes to this picture so lab work to assess will also be beneficial.  We will also continue this conversation regarding her abdominal discomfort/constipation at another visit.

## 2024-06-11 ENCOUNTER — HOSPITAL ENCOUNTER (OUTPATIENT)
Age: 74
Setting detail: SPECIMEN
Discharge: HOME | End: 2024-06-11

## 2024-06-11 DIAGNOSIS — E11.9 TYPE 2 DIABETES MELLITUS WITHOUT COMPLICATION, WITHOUT LONG-TERM CURRENT USE OF INSULIN (HCC): ICD-10-CM

## 2024-06-11 DIAGNOSIS — R10.9 ABDOMINAL DISCOMFORT: ICD-10-CM

## 2024-06-11 LAB
ALBUMIN SERPL-MCNC: 4.1 G/DL (ref 3.5–5.2)
ALBUMIN/GLOB SERPL: 1 {RATIO} (ref 1–2.5)
ALP SERPL-CCNC: 71 U/L (ref 35–104)
ALT SERPL-CCNC: <5 U/L (ref 10–35)
ANION GAP SERPL CALCULATED.3IONS-SCNC: 10 MMOL/L (ref 9–16)
AST SERPL-CCNC: 23 U/L (ref 10–35)
BASOPHILS # BLD: 0.07 K/UL (ref 0–0.2)
BASOPHILS NFR BLD: 1 % (ref 0–2)
BILIRUB SERPL-MCNC: 0.3 MG/DL (ref 0–1.2)
BUN SERPL-MCNC: 17 MG/DL (ref 8–23)
CALCIUM SERPL-MCNC: 9.3 MG/DL (ref 8.6–10.4)
CHLORIDE SERPL-SCNC: 103 MMOL/L (ref 98–107)
CHOLEST SERPL-MCNC: 176 MG/DL (ref 0–199)
CHOLESTEROL/HDL RATIO: 3
CO2 SERPL-SCNC: 26 MMOL/L (ref 20–31)
CREAT SERPL-MCNC: 0.9 MG/DL (ref 0.5–0.9)
CRP SERPL HS-MCNC: 3.8 MG/L (ref 0–5)
EOSINOPHIL # BLD: 0.14 K/UL (ref 0–0.44)
EOSINOPHILS RELATIVE PERCENT: 2 % (ref 1–4)
ERYTHROCYTE [DISTWIDTH] IN BLOOD BY AUTOMATED COUNT: 19 % (ref 11.8–14.4)
ERYTHROCYTE [SEDIMENTATION RATE] IN BLOOD BY PHOTOMETRIC METHOD: 30 MM/HR (ref 0–30)
GFR, ESTIMATED: 71 ML/MIN/1.73M2
GLUCOSE SERPL-MCNC: 168 MG/DL (ref 74–99)
HCT VFR BLD AUTO: 35 % (ref 36.3–47.1)
HDLC SERPL-MCNC: 56 MG/DL
HGB BLD-MCNC: 10.2 G/DL (ref 11.9–15.1)
IMM GRANULOCYTES # BLD AUTO: <0.03 K/UL (ref 0–0.3)
IMM GRANULOCYTES NFR BLD: 0 %
LDLC SERPL CALC-MCNC: 74 MG/DL (ref 0–100)
LYMPHOCYTES NFR BLD: 1.67 K/UL (ref 1.1–3.7)
MAGNESIUM SERPL-MCNC: 1.9 MG/DL (ref 1.6–2.4)
MCH RBC QN AUTO: 22.9 PG (ref 25.2–33.5)
MCHC RBC AUTO-ENTMCNC: 29.1 G/DL (ref 28.4–34.8)
MCV RBC AUTO: 78.7 FL (ref 82.6–102.9)
MONOCYTES NFR BLD: 0.8 K/UL (ref 0.1–1.2)
MONOCYTES NFR BLD: 13 % (ref 3–12)
NEUTROPHILS NFR BLD: 58 % (ref 36–65)
NEUTS SEG NFR BLD: 3.69 K/UL (ref 1.5–8.1)
NRBC BLD-RTO: 0 PER 100 WBC
PLATELET # BLD AUTO: 476 K/UL (ref 138–453)
PMV BLD AUTO: 11.8 FL (ref 8.1–13.5)
POTASSIUM SERPL-SCNC: 4.2 MMOL/L (ref 3.7–5.3)
PROT SERPL-MCNC: 7.4 G/DL (ref 6.6–8.7)
RBC # BLD AUTO: 4.45 M/UL (ref 3.95–5.11)
RBC # BLD: ABNORMAL 10*6/UL
SODIUM SERPL-SCNC: 139 MMOL/L (ref 136–145)
TRIGL SERPL-MCNC: 232 MG/DL
VLDLC SERPL CALC-MCNC: 46 MG/DL
WBC OTHER # BLD: 6.4 K/UL (ref 3.5–11.3)

## 2024-06-12 LAB
CREAT UR-MCNC: 102 MG/DL (ref 28–217)
EST. AVERAGE GLUCOSE BLD GHB EST-MCNC: 186 MG/DL
HBA1C MFR BLD: 8.1 % (ref 4–6)
MICROALBUMIN UR-MCNC: <12 MG/L (ref 0–20)
MICROALBUMIN/CREAT UR-RTO: NORMAL MCG/MG CREAT (ref 0–25)

## 2024-06-14 ENCOUNTER — TELEPHONE (OUTPATIENT)
Age: 74
End: 2024-06-14

## 2024-06-14 NOTE — TELEPHONE ENCOUNTER
Spoke with Jose in radiology and she stated that the CTA that was ordered should just be a Ct Abdomin & Pelvis W/Contrast due to the diagnosis. She stated that if they do a CTA then it's going to highlight the arteries and they won't be able to see if their is an abscess or not. Please put in an corrected order.

## 2024-06-18 ENCOUNTER — HOSPITAL ENCOUNTER (OUTPATIENT)
Dept: CT IMAGING | Age: 74
Discharge: HOME | End: 2024-06-20
Payer: MEDICARE

## 2024-06-18 DIAGNOSIS — R10.9 ABDOMINAL DISCOMFORT: ICD-10-CM

## 2024-06-18 PROCEDURE — 6360000004 HC RX CONTRAST MEDICATION

## 2024-06-18 PROCEDURE — 74177 CT ABD & PELVIS W/CONTRAST: CPT

## 2024-06-18 PROCEDURE — 2580000003 HC RX 258

## 2024-06-18 RX ORDER — 0.9 % SODIUM CHLORIDE 0.9 %
80 INTRAVENOUS SOLUTION INTRAVENOUS ONCE
Status: COMPLETED | OUTPATIENT
Start: 2024-06-18 | End: 2024-06-18

## 2024-06-18 RX ORDER — SODIUM CHLORIDE 0.9 % (FLUSH) 0.9 %
10 SYRINGE (ML) INJECTION PRN
Status: DISCONTINUED | OUTPATIENT
Start: 2024-06-18 | End: 2024-06-21 | Stop reason: HOSPADM

## 2024-06-18 RX ADMIN — IOPAMIDOL 75 ML: 755 INJECTION, SOLUTION INTRAVENOUS at 14:15

## 2024-06-18 RX ADMIN — SODIUM CHLORIDE, PRESERVATIVE FREE 10 ML: 5 INJECTION INTRAVENOUS at 14:15

## 2024-06-18 RX ADMIN — SODIUM CHLORIDE 80 ML: 9 INJECTION, SOLUTION INTRAVENOUS at 14:15

## 2024-06-24 NOTE — PROGRESS NOTES
equation is less accurate in patients with extremes of muscle mass, extra-renal   metabolism of creatine, excessive creatine ingestion, or following therapy that affects   renal tubular secretion.      Calcium 06/11/2024 9.3  8.6 - 10.4 mg/dL Final    Total Protein 06/11/2024 7.4  6.6 - 8.7 g/dL Final    Albumin 06/11/2024 4.1  3.5 - 5.2 g/dL Final    Albumin/Globulin Ratio 06/11/2024 1.0  1.0 - 2.5 Final    Total Bilirubin 06/11/2024 0.3  0.00 - 1.20 mg/dL Final    Alkaline Phosphatase 06/11/2024 71  35 - 104 U/L Final    ALT 06/11/2024 <5 (L)  10 - 35 U/L Final    AST 06/11/2024 23  10 - 35 U/L Final    Hemoglobin A1C 06/11/2024 8.1 (H)  4.0 - 6.0 % Final    Estimated Avg Glucose 06/11/2024 186  mg/dL Final    Comment: The ADA and AACC recommend providing the estimated average glucose result to permit better   patient understanding of their HBA1c result.      WBC 06/11/2024 6.4  3.5 - 11.3 k/uL Final    RBC 06/11/2024 4.45  3.95 - 5.11 m/uL Final    Hemoglobin 06/11/2024 10.2 (L)  11.9 - 15.1 g/dL Final    Hematocrit 06/11/2024 35.0 (L)  36.3 - 47.1 % Final    MCV 06/11/2024 78.7 (L)  82.6 - 102.9 fL Final    MCH 06/11/2024 22.9 (L)  25.2 - 33.5 pg Final    MCHC 06/11/2024 29.1  28.4 - 34.8 g/dL Final    RDW 06/11/2024 19.0 (H)  11.8 - 14.4 % Final    Platelets 06/11/2024 476 (H)  138 - 453 k/uL Final    MPV 06/11/2024 11.8  8.1 - 13.5 fL Final    NRBC Automated 06/11/2024 0.0  0.0 per 100 WBC Final    Neutrophils % 06/11/2024 58  36 - 65 % Final    Lymphocytes % 06/11/2024 26  24 - 43 % Final    Monocytes % 06/11/2024 13 (H)  3 - 12 % Final    Eosinophils % 06/11/2024 2  1 - 4 % Final    Basophils % 06/11/2024 1  0 - 2 % Final    Immature Granulocytes % 06/11/2024 0  0 % Final    Neutrophils Absolute 06/11/2024 3.69  1.50 - 8.10 k/uL Final    Lymphocytes Absolute 06/11/2024 1.67  1.10 - 3.70 k/uL Final    Monocytes Absolute 06/11/2024 0.80  0.10 - 1.20 k/uL Final    Eosinophils Absolute 06/11/2024 0.14  0.00 -

## 2024-06-26 ENCOUNTER — OFFICE VISIT (OUTPATIENT)
Age: 74
End: 2024-06-26
Payer: MEDICARE

## 2024-06-26 VITALS
WEIGHT: 247.8 LBS | TEMPERATURE: 98.1 F | RESPIRATION RATE: 16 BRPM | HEIGHT: 65 IN | HEART RATE: 66 BPM | SYSTOLIC BLOOD PRESSURE: 123 MMHG | BODY MASS INDEX: 41.29 KG/M2 | DIASTOLIC BLOOD PRESSURE: 62 MMHG

## 2024-06-26 DIAGNOSIS — E11.9 TYPE 2 DIABETES MELLITUS WITHOUT COMPLICATION, WITHOUT LONG-TERM CURRENT USE OF INSULIN (HCC): ICD-10-CM

## 2024-06-26 DIAGNOSIS — R91.8 RIGHT LOWER LOBE LUNG MASS: Primary | ICD-10-CM

## 2024-06-26 PROCEDURE — 2022F DILAT RTA XM EVC RTNOPTHY: CPT | Performed by: FAMILY MEDICINE

## 2024-06-26 PROCEDURE — 1090F PRES/ABSN URINE INCON ASSESS: CPT | Performed by: FAMILY MEDICINE

## 2024-06-26 PROCEDURE — 99212 OFFICE O/P EST SF 10 MIN: CPT

## 2024-06-26 PROCEDURE — 3052F HG A1C>EQUAL 8.0%<EQUAL 9.0%: CPT

## 2024-06-26 PROCEDURE — G8400 PT W/DXA NO RESULTS DOC: HCPCS | Performed by: FAMILY MEDICINE

## 2024-06-26 PROCEDURE — G8417 CALC BMI ABV UP PARAM F/U: HCPCS | Performed by: FAMILY MEDICINE

## 2024-06-26 PROCEDURE — 1036F TOBACCO NON-USER: CPT | Performed by: FAMILY MEDICINE

## 2024-06-26 PROCEDURE — 1123F ACP DISCUSS/DSCN MKR DOCD: CPT

## 2024-06-26 PROCEDURE — 99214 OFFICE O/P EST MOD 30 MIN: CPT

## 2024-06-26 PROCEDURE — G8427 DOCREV CUR MEDS BY ELIG CLIN: HCPCS | Performed by: FAMILY MEDICINE

## 2024-06-26 PROCEDURE — 3017F COLORECTAL CA SCREEN DOC REV: CPT | Performed by: FAMILY MEDICINE

## 2024-06-26 PROCEDURE — 3074F SYST BP LT 130 MM HG: CPT

## 2024-06-26 PROCEDURE — 3078F DIAST BP <80 MM HG: CPT

## 2024-06-26 RX ORDER — DULAGLUTIDE 0.75 MG/.5ML
0.75 INJECTION, SOLUTION SUBCUTANEOUS WEEKLY
Qty: 2 ML | Refills: 0 | Status: SHIPPED | OUTPATIENT
Start: 2024-06-26 | End: 2024-07-18

## 2024-06-26 ASSESSMENT — PATIENT HEALTH QUESTIONNAIRE - PHQ9
1. LITTLE INTEREST OR PLEASURE IN DOING THINGS: NOT AT ALL
SUM OF ALL RESPONSES TO PHQ QUESTIONS 1-9: 0
SUM OF ALL RESPONSES TO PHQ9 QUESTIONS 1 & 2: 0
SUM OF ALL RESPONSES TO PHQ QUESTIONS 1-9: 0
2. FEELING DOWN, DEPRESSED OR HOPELESS: NOT AT ALL

## 2024-06-26 NOTE — PATIENT INSTRUCTIONS
Thank you for following up with us at [facility] outpatient residency clinic! It was a pleasure to meet you today!     Today we discussed the below as next steps in your care:  Medication treatment for your diabetes including Jardiance and Trulicity  There was a lung mass in the right lower lobe that was found on the CT of your abdomen pelvis.  It is important at this time that we get a CT with contrast of your lungs to better see what this mass could potentially be.    If you have any additional questions or concerns, please call the office ([phone]) and speak to one of the staff. They will triage and forward the message to the doctors! Have a great rest of your day!

## 2024-06-30 NOTE — ASSESSMENT & PLAN NOTE
Sherley Reed is here for DM type 2 follow up. She is doing well. Denies any hypoglycemic events or complications. No new symptoms. A1C is elevated. Patient not currently taking any medications. She declines metformin because she feels it causes skin changes. We agreed on starting Trulicty and Jardiance. Jardiance in particular because of her history of heart failure. Prior to this she was using her husbands insulin and glucometer to manage her diabetes. At our follow-up appointment for her lung mass we will schedule her diabetes follow-up.At the follow-up we will discuss a foot exam and whether or not she is seeing an eye doctor. Her 10-year ASCVD risk score (James JIMENEZ, et al., 2019) is: 32.7%. However the patient does not want to take a statin because she says she has a strong family history of the muscle cramps. She does however take ezetimbe.

## 2024-06-30 NOTE — ASSESSMENT & PLAN NOTE
Patient had a CT of the abdomen and pelvis and there was an incidental finding of a right lower lung lobe mass. It is not completely clear due to the fact that the imaging study was not a dedicated scan of the chest. Will order this and go from there.

## 2024-07-02 ENCOUNTER — HOSPITAL ENCOUNTER (OUTPATIENT)
Dept: CT IMAGING | Age: 74
Discharge: HOME | End: 2024-07-04
Payer: MEDICARE

## 2024-07-02 DIAGNOSIS — R91.8 RIGHT LOWER LOBE LUNG MASS: ICD-10-CM

## 2024-07-02 PROCEDURE — 2580000003 HC RX 258

## 2024-07-02 PROCEDURE — 6360000004 HC RX CONTRAST MEDICATION

## 2024-07-02 PROCEDURE — 71260 CT THORAX DX C+: CPT

## 2024-07-02 RX ORDER — 0.9 % SODIUM CHLORIDE 0.9 %
80 INTRAVENOUS SOLUTION INTRAVENOUS ONCE
Status: COMPLETED | OUTPATIENT
Start: 2024-07-02 | End: 2024-07-02

## 2024-07-02 RX ORDER — SODIUM CHLORIDE 0.9 % (FLUSH) 0.9 %
10 SYRINGE (ML) INJECTION PRN
Status: DISCONTINUED | OUTPATIENT
Start: 2024-07-02 | End: 2024-07-05 | Stop reason: HOSPADM

## 2024-07-02 RX ADMIN — SODIUM CHLORIDE, PRESERVATIVE FREE 10 ML: 5 INJECTION INTRAVENOUS at 15:08

## 2024-07-02 RX ADMIN — IOPAMIDOL 75 ML: 755 INJECTION, SOLUTION INTRAVENOUS at 15:04

## 2024-07-02 RX ADMIN — SODIUM CHLORIDE 80 ML: 9 INJECTION, SOLUTION INTRAVENOUS at 15:07

## 2024-07-05 ENCOUNTER — TELEPHONE (OUTPATIENT)
Age: 74
End: 2024-07-05

## 2024-07-05 DIAGNOSIS — R91.8 ENDOBRONCHIAL MASS: Primary | ICD-10-CM

## 2024-07-05 NOTE — TELEPHONE ENCOUNTER
I spoke with patient over the phone at around 1 PM today regarding her chest CT scan results.    She is a 73-year-old female with a history of sarcoidosis in remission. She used to see Dr. Monique but he retired June 30, 2024 and she would like to establish care with a new pulmonologist. An incidental finding of a right lower lobe mass was found on abdominal CT scan that was ordered for abdominal discomfort and history of abdominal abscess. Chest CT showed a right lower lobe/right mainstem bronchus endobronchial mass extending into the lung parenchyma measuring 4.1x 8 x 5.6 cm with PET/CT and for tissue sampling recommended. Patient also had an enlarged right hilar lymph node measuring 1.8 cm.    A referral was placed for Dr. Trujillo. I PerfectServed Dr. Cr Trujillo regarding this patient and he let his medical assistant know that he would like her scheduled ASAP for an urgent appointment and will likely need a bronchoscopy with EBUS.    -Salah Awadalla, MD

## 2024-07-11 ENCOUNTER — HOSPITAL ENCOUNTER (OUTPATIENT)
Age: 74
Setting detail: SPECIMEN
Discharge: HOME | End: 2024-07-11

## 2024-07-13 LAB — ACE SERPL-CCNC: 24 U/L (ref 16–85)

## 2024-07-17 ENCOUNTER — TELEPHONE (OUTPATIENT)
Age: 74
End: 2024-07-17

## 2024-07-17 NOTE — TELEPHONE ENCOUNTER
Called the patient to follow-up regarding her visit with Dr. Trujillo where the mass in the right lower lobe was discussed.  Per discussion with Dr. Trujillo's it was less likely that it was cancer and was more likely to be her sarcoidosis.  He was able to compare imaging from a CT in 2018 where he saw evidence of the mass at that time.  He recommended that she get a bronchoscopy for further assessment the next day and she declined.  Instead she opted for getting the bronchoscopy after he had come back from vacation and she had an opportunity to process.  She discussed the fact that the reason why she did not want to have a bronchoscopy at that time was that she was being overwhelmed by a lot of information and does not normally digest information and make decisions that fast.  She felt that everything was coming to her quickly starting with the call where she learned she might possibly have cancer then coming to visit Dr. Trujillo and learning that she would need a bronchoscopy.  She also wanted to know whether or not it was an option for her to discontinue with treatment and skip over the bronchoscopy.  We discussed the fact that the best option at this point is to have a discussion with Dr. Trujillo where she would ask about what the next steps would be pending the results of bronchoscopy and what his expectations for management would be.  She verbalized understanding and said that she would have that discussion with him.

## 2024-07-19 ENCOUNTER — HOSPITAL ENCOUNTER (OUTPATIENT)
Dept: PREADMISSION TESTING | Age: 74
Discharge: HOME | End: 2024-07-23

## 2024-07-19 ENCOUNTER — ANESTHESIA EVENT (OUTPATIENT)
Dept: ENDOSCOPY | Age: 74
End: 2024-07-19
Payer: MEDICARE

## 2024-07-19 NOTE — PROGRESS NOTES
Pre-op Instructions For Out-Patient Endoscopy Surgery    Medication Instructions:  Please stop herbs and any supplements now (includes vitamins and minerals).    Please contact your surgeon and prescribing physician for pre-op instructions for any blood thinners.    If you have inhalers/aerosol treatments at home, please use them the morning of your surgery and bring the inhalers with you to the hospital.    Please take the following medications the morning of your surgery with a sip of water:    Diltiazem, Omeprazole    Surgery Instructions:  After midnight before surgery:  Do not eat or drink anything, including water, mints, gum, and hard candy.  You may brush your teeth without swallowing.  No smoking, chewing tobacco, or street drugs.    Please shower or bathe before surgery.       Please do not wear any cologne, lotion, powder, jewelry, piercings, perfume, makeup, nail polish, hair accessories, or hair spray on the day of surgery.  Wear loose comfortable clothing.    Leave your valuables at home but bring a payment source for any after-surgery prescriptions you plan to fill at [facility].  Bring a storage case for any glasses/contacts.    An adult who is responsible for you MUST drive you home and should be with you for the first 24 hours after surgery.     The Day of Surgery:  Arrive at [facility] Surgery Entrance at the time directed by your surgeon and check in at the desk.     If you have a living will or healthcare power of , please bring a copy.    You will be taken to the pre-op holding area where you will be prepared for surgery.  A physical assessment will be performed by a nurse practitioner or house officer.  Your IV will be started and you will meet your anesthesiologist.    When you go to surgery, your family will be directed to the surgical waiting room, where the doctor should speak with them after your surgery.    After surgery, you will be taken to the

## 2024-07-19 NOTE — PROGRESS NOTES
Spoke with patient at lengths regarding procedure and what to expect. Patient is very anxious and scared. Much reassurance given. Really encouraged patient to have procedure done.

## 2024-07-22 DIAGNOSIS — E11.9 TYPE 2 DIABETES MELLITUS WITHOUT COMPLICATION, WITHOUT LONG-TERM CURRENT USE OF INSULIN (HCC): ICD-10-CM

## 2024-07-22 RX ORDER — DULAGLUTIDE 0.75 MG/.5ML
0.75 INJECTION, SOLUTION SUBCUTANEOUS WEEKLY
COMMUNITY
End: 2024-07-22 | Stop reason: SDUPTHER

## 2024-07-22 RX ORDER — DULAGLUTIDE 0.75 MG/.5ML
0.75 INJECTION, SOLUTION SUBCUTANEOUS WEEKLY
Qty: 4 ADJUSTABLE DOSE PRE-FILLED PEN SYRINGE | Refills: 0 | Status: SHIPPED | OUTPATIENT
Start: 2024-07-22

## 2024-07-23 NOTE — PRE-PROCEDURE INSTRUCTIONS
Nothing to eat after midnight. Y  Are you taking any blood thinners? When was the last day?STOPPED  Make sure to use Hibiclens prior to surgery.NA  Remove any jewelry and body piercings.Y  Do you wear glasses? If so, please bring a case to store them in.  Are you having any Covid symptoms?N  Do you have any new rashes, infections, etc. that we should be aware of?N  Do you have a ride home the day of surgery? It cannot be a cab or medical transportation.Y  Verify surgery time and what time to arrive at hospital.0900

## 2024-07-24 ENCOUNTER — ANESTHESIA (OUTPATIENT)
Dept: ENDOSCOPY | Age: 74
End: 2024-07-24
Payer: MEDICARE

## 2024-07-24 ENCOUNTER — HOSPITAL ENCOUNTER (OUTPATIENT)
Age: 74
Setting detail: OUTPATIENT SURGERY
Discharge: HOME | End: 2024-07-24
Attending: INTERNAL MEDICINE | Admitting: INTERNAL MEDICINE
Payer: MEDICARE

## 2024-07-24 VITALS
OXYGEN SATURATION: 96 % | HEART RATE: 60 BPM | TEMPERATURE: 97.7 F | DIASTOLIC BLOOD PRESSURE: 68 MMHG | HEIGHT: 64 IN | BODY MASS INDEX: 40.63 KG/M2 | RESPIRATION RATE: 21 BRPM | SYSTOLIC BLOOD PRESSURE: 126 MMHG | WEIGHT: 238 LBS

## 2024-07-24 DIAGNOSIS — R91.8 LUNG MASS: ICD-10-CM

## 2024-07-24 DIAGNOSIS — E11.9 TYPE 2 DIABETES MELLITUS WITHOUT COMPLICATION, WITHOUT LONG-TERM CURRENT USE OF INSULIN (HCC): ICD-10-CM

## 2024-07-24 LAB
APPEARANCE FLD: NORMAL
BLASTS NFR FLD: 0 %
BODY FLD TYPE: NORMAL
COLOR FLD: NORMAL
EOSINOPHIL NFR FLD: 0 %
GLUCOSE BLD-MCNC: 159 MG/DL (ref 65–105)
LYMPHOCYTES NFR FLD: 10 %
MONOCYTES NFR FLD: 6 %
NEUTROPHILS NFR FLD: 84 %
RBC # FLD: NORMAL CELLS/UL
WBC # FLD: 156 CELLS/UL

## 2024-07-24 PROCEDURE — 3609027000 HC BRONCHOSCOPY: Performed by: INTERNAL MEDICINE

## 2024-07-24 PROCEDURE — 87102 FUNGUS ISOLATION CULTURE: CPT

## 2024-07-24 PROCEDURE — 2500000003 HC RX 250 WO HCPCS: Performed by: INTERNAL MEDICINE

## 2024-07-24 PROCEDURE — 88342 IMHCHEM/IMCYTCHM 1ST ANTB: CPT

## 2024-07-24 PROCEDURE — 6360000002 HC RX W HCPCS: Performed by: NURSE ANESTHETIST, CERTIFIED REGISTERED

## 2024-07-24 PROCEDURE — 88360 TUMOR IMMUNOHISTOCHEM/MANUAL: CPT

## 2024-07-24 PROCEDURE — 3700000001 HC ADD 15 MINUTES (ANESTHESIA): Performed by: INTERNAL MEDICINE

## 2024-07-24 PROCEDURE — 7100000011 HC PHASE II RECOVERY - ADDTL 15 MIN: Performed by: INTERNAL MEDICINE

## 2024-07-24 PROCEDURE — 82947 ASSAY GLUCOSE BLOOD QUANT: CPT

## 2024-07-24 PROCEDURE — 87205 SMEAR GRAM STAIN: CPT

## 2024-07-24 PROCEDURE — 88112 CYTOPATH CELL ENHANCE TECH: CPT

## 2024-07-24 PROCEDURE — 2709999900 HC NON-CHARGEABLE SUPPLY: Performed by: INTERNAL MEDICINE

## 2024-07-24 PROCEDURE — 2500000003 HC RX 250 WO HCPCS: Performed by: NURSE ANESTHETIST, CERTIFIED REGISTERED

## 2024-07-24 PROCEDURE — 88341 IMHCHEM/IMCYTCHM EA ADD ANTB: CPT

## 2024-07-24 PROCEDURE — 3700000000 HC ANESTHESIA ATTENDED CARE: Performed by: INTERNAL MEDICINE

## 2024-07-24 PROCEDURE — 7100000010 HC PHASE II RECOVERY - FIRST 15 MIN: Performed by: INTERNAL MEDICINE

## 2024-07-24 PROCEDURE — 88305 TISSUE EXAM BY PATHOLOGIST: CPT

## 2024-07-24 PROCEDURE — 87116 MYCOBACTERIA CULTURE: CPT

## 2024-07-24 PROCEDURE — 2580000003 HC RX 258: Performed by: ANESTHESIOLOGY

## 2024-07-24 PROCEDURE — 87015 SPECIMEN INFECT AGNT CONCNTJ: CPT

## 2024-07-24 PROCEDURE — 87070 CULTURE OTHR SPECIMN AEROBIC: CPT

## 2024-07-24 PROCEDURE — 87206 SMEAR FLUORESCENT/ACID STAI: CPT

## 2024-07-24 RX ORDER — FENTANYL CITRATE 0.05 MG/ML
25 INJECTION, SOLUTION INTRAMUSCULAR; INTRAVENOUS EVERY 5 MIN PRN
Status: CANCELLED | OUTPATIENT
Start: 2024-07-24

## 2024-07-24 RX ORDER — ONDANSETRON 2 MG/ML
4 INJECTION INTRAMUSCULAR; INTRAVENOUS
Status: CANCELLED | OUTPATIENT
Start: 2024-07-24 | End: 2024-07-25

## 2024-07-24 RX ORDER — SODIUM CHLORIDE 0.9 % (FLUSH) 0.9 %
5-40 SYRINGE (ML) INJECTION PRN
Status: CANCELLED | OUTPATIENT
Start: 2024-07-24

## 2024-07-24 RX ORDER — ROCURONIUM BROMIDE 10 MG/ML
INJECTION, SOLUTION INTRAVENOUS PRN
Status: DISCONTINUED | OUTPATIENT
Start: 2024-07-24 | End: 2024-07-24 | Stop reason: SDUPTHER

## 2024-07-24 RX ORDER — MIDAZOLAM HYDROCHLORIDE 1 MG/ML
INJECTION INTRAMUSCULAR; INTRAVENOUS PRN
Status: DISCONTINUED | OUTPATIENT
Start: 2024-07-24 | End: 2024-07-24 | Stop reason: SDUPTHER

## 2024-07-24 RX ORDER — SODIUM CHLORIDE 0.9 % (FLUSH) 0.9 %
5-40 SYRINGE (ML) INJECTION EVERY 12 HOURS SCHEDULED
Status: CANCELLED | OUTPATIENT
Start: 2024-07-24

## 2024-07-24 RX ORDER — METOCLOPRAMIDE HYDROCHLORIDE 5 MG/ML
10 INJECTION INTRAMUSCULAR; INTRAVENOUS
Status: CANCELLED | OUTPATIENT
Start: 2024-07-24 | End: 2024-07-25

## 2024-07-24 RX ORDER — SODIUM CHLORIDE 0.9 % (FLUSH) 0.9 %
5-40 SYRINGE (ML) INJECTION PRN
Status: DISCONTINUED | OUTPATIENT
Start: 2024-07-24 | End: 2024-07-24 | Stop reason: HOSPADM

## 2024-07-24 RX ORDER — LIDOCAINE HYDROCHLORIDE 20 MG/ML
INJECTION, SOLUTION INFILTRATION; PERINEURAL PRN
Status: DISCONTINUED | OUTPATIENT
Start: 2024-07-24 | End: 2024-07-24 | Stop reason: SDUPTHER

## 2024-07-24 RX ORDER — EMPAGLIFLOZIN 10 MG/1
10 TABLET, FILM COATED ORAL DAILY
Qty: 30 TABLET | Refills: 2 | OUTPATIENT
Start: 2024-07-24

## 2024-07-24 RX ORDER — SODIUM CHLORIDE, SODIUM LACTATE, POTASSIUM CHLORIDE, CALCIUM CHLORIDE 600; 310; 30; 20 MG/100ML; MG/100ML; MG/100ML; MG/100ML
INJECTION, SOLUTION INTRAVENOUS CONTINUOUS
Status: DISCONTINUED | OUTPATIENT
Start: 2024-07-24 | End: 2024-07-24 | Stop reason: HOSPADM

## 2024-07-24 RX ORDER — NALOXONE HYDROCHLORIDE 0.4 MG/ML
INJECTION, SOLUTION INTRAMUSCULAR; INTRAVENOUS; SUBCUTANEOUS PRN
Status: CANCELLED | OUTPATIENT
Start: 2024-07-24

## 2024-07-24 RX ORDER — PROPOFOL 10 MG/ML
INJECTION, EMULSION INTRAVENOUS PRN
Status: DISCONTINUED | OUTPATIENT
Start: 2024-07-24 | End: 2024-07-24 | Stop reason: SDUPTHER

## 2024-07-24 RX ORDER — LIDOCAINE HYDROCHLORIDE 10 MG/ML
INJECTION, SOLUTION EPIDURAL; INFILTRATION; INTRACAUDAL; PERINEURAL PRN
Status: DISCONTINUED | OUTPATIENT
Start: 2024-07-24 | End: 2024-07-24 | Stop reason: ALTCHOICE

## 2024-07-24 RX ORDER — SODIUM CHLORIDE 0.9 % (FLUSH) 0.9 %
5-40 SYRINGE (ML) INJECTION EVERY 12 HOURS SCHEDULED
Status: DISCONTINUED | OUTPATIENT
Start: 2024-07-24 | End: 2024-07-24 | Stop reason: HOSPADM

## 2024-07-24 RX ORDER — LORAZEPAM 2 MG/ML
0.5 INJECTION INTRAMUSCULAR
Status: CANCELLED | OUTPATIENT
Start: 2024-07-24 | End: 2024-07-25

## 2024-07-24 RX ORDER — LIDOCAINE HYDROCHLORIDE 10 MG/ML
1 INJECTION, SOLUTION EPIDURAL; INFILTRATION; INTRACAUDAL; PERINEURAL
Status: DISCONTINUED | OUTPATIENT
Start: 2024-07-24 | End: 2024-07-24 | Stop reason: HOSPADM

## 2024-07-24 RX ORDER — SODIUM CHLORIDE 9 MG/ML
INJECTION, SOLUTION INTRAVENOUS PRN
Status: CANCELLED | OUTPATIENT
Start: 2024-07-24

## 2024-07-24 RX ORDER — ONDANSETRON 2 MG/ML
INJECTION INTRAMUSCULAR; INTRAVENOUS PRN
Status: DISCONTINUED | OUTPATIENT
Start: 2024-07-24 | End: 2024-07-24 | Stop reason: SDUPTHER

## 2024-07-24 RX ORDER — SUCCINYLCHOLINE/SOD CL,ISO/PF 200MG/10ML
SYRINGE (ML) INTRAVENOUS PRN
Status: DISCONTINUED | OUTPATIENT
Start: 2024-07-24 | End: 2024-07-24 | Stop reason: SDUPTHER

## 2024-07-24 RX ORDER — LORAZEPAM 2 MG/ML
INJECTION INTRAMUSCULAR
Status: DISCONTINUED
Start: 2024-07-24 | End: 2024-07-24 | Stop reason: WASHOUT

## 2024-07-24 RX ORDER — SODIUM CHLORIDE 9 MG/ML
INJECTION, SOLUTION INTRAVENOUS PRN
Status: DISCONTINUED | OUTPATIENT
Start: 2024-07-24 | End: 2024-07-24 | Stop reason: HOSPADM

## 2024-07-24 RX ORDER — DEXAMETHASONE SODIUM PHOSPHATE 4 MG/ML
INJECTION, SOLUTION INTRA-ARTICULAR; INTRALESIONAL; INTRAMUSCULAR; INTRAVENOUS; SOFT TISSUE PRN
Status: DISCONTINUED | OUTPATIENT
Start: 2024-07-24 | End: 2024-07-24 | Stop reason: SDUPTHER

## 2024-07-24 RX ADMIN — DEXAMETHASONE SODIUM PHOSPHATE 4 MG: 4 INJECTION INTRA-ARTICULAR; INTRALESIONAL; INTRAMUSCULAR; INTRAVENOUS; SOFT TISSUE at 10:58

## 2024-07-24 RX ADMIN — LIDOCAINE HYDROCHLORIDE 60 MG: 20 INJECTION, SOLUTION INFILTRATION; PERINEURAL at 10:47

## 2024-07-24 RX ADMIN — ROCURONIUM BROMIDE 10 MG: 10 INJECTION, SOLUTION INTRAVENOUS at 10:47

## 2024-07-24 RX ADMIN — ROCURONIUM BROMIDE 30 MG: 10 INJECTION, SOLUTION INTRAVENOUS at 10:57

## 2024-07-24 RX ADMIN — Medication 100 MG: at 10:47

## 2024-07-24 RX ADMIN — MIDAZOLAM 1 MG: 1 INJECTION INTRAMUSCULAR; INTRAVENOUS at 10:44

## 2024-07-24 RX ADMIN — MIDAZOLAM 1 MG: 1 INJECTION INTRAMUSCULAR; INTRAVENOUS at 10:41

## 2024-07-24 RX ADMIN — ONDANSETRON 4 MG: 2 INJECTION INTRAMUSCULAR; INTRAVENOUS at 10:58

## 2024-07-24 RX ADMIN — PROPOFOL 150 MG: 10 INJECTION, EMULSION INTRAVENOUS at 10:47

## 2024-07-24 RX ADMIN — SUGAMMADEX 200 MG: 100 INJECTION, SOLUTION INTRAVENOUS at 11:18

## 2024-07-24 RX ADMIN — SODIUM CHLORIDE, POTASSIUM CHLORIDE, SODIUM LACTATE AND CALCIUM CHLORIDE: 600; 310; 30; 20 INJECTION, SOLUTION INTRAVENOUS at 10:22

## 2024-07-24 ASSESSMENT — ENCOUNTER SYMPTOMS
SORE THROAT: 0
SHORTNESS OF BREATH: 1
TROUBLE SWALLOWING: 1
ABDOMINAL PAIN: 0
SHORTNESS OF BREATH: 0
COUGH: 1
BACK PAIN: 0
GASTROINTESTINAL NEGATIVE: 1

## 2024-07-24 ASSESSMENT — PAIN - FUNCTIONAL ASSESSMENT: PAIN_FUNCTIONAL_ASSESSMENT: 0-10

## 2024-07-24 NOTE — ANESTHESIA POSTPROCEDURE EVALUATION
Department of Anesthesiology  Postprocedure Note    Patient: Sherley Reed  MRN: [id number]  YOB: 1950  Date of evaluation: 7/24/2024    Procedure Summary       Date: 07/24/24 Room / Location: [address]    Anesthesia Start: 1043 Anesthesia Stop: 1129    Procedure: BRONCHOSCOPY (Bronchus) Diagnosis:       Lung mass      (Lung mass [R91.8])    Surgeons: Cr Trujillo MD Responsible Provider: May Montilla MD    Anesthesia Type: general ASA Status: 3            Anesthesia Type: No value filed.    Adan Phase I:      Adan Phase II: Adan Score: 10    Anesthesia Post Evaluation    Comments: POST- ANESTHESIA EVALUATION       Pt Name: Sherley Reed  MRN: [id number]  YOB: 1950  Date of evaluation: 7/24/2024  Time:  12:38 PM      /68   Pulse 60   Temp 97.7 °F (36.5 °C)   Resp 21   Ht 1.626 m (5' 4\")   Wt 108 kg (238 lb)   SpO2 96%   BMI 40.85 kg/m²      Consciousness Level  Awake  Cardiopulmonary Status  Stable  Pain Adequately Treated YES  Nausea / Vomiting  NO  Adequate Hydration  YES  Anesthesia Related Complications NONE      Electronically signed by May Montilla MD on 7/24/2024 at 12:38 PM      No notable events documented.

## 2024-07-24 NOTE — PROCEDURES
PROCEDURE NOTE  Date: 7/24/2024   Name: Sherley Reed  YOB: 1950    Procedures  Bronchoscopy with endobronchial biopsy of bronchus intermedius mass and washings from bronchus intermedius    Physician: GEOVANI Trujillo    Informed consent signed and timeout performed    Indication: Mass obstructing right-sided airways    Description:  Patient sedated with general anesthesia.  Intubated by anesthesia staff.  Bronchoscope entered via endotracheal tube after 6 mL 1% lidocaine was administered.  Airways were inspected up to the segmental level.  Left upper lobe, lingula, left lower lobe and right upper lobe were all within normal limits.    Bronchus intermedius shows a fungating mass with subtotal occlusion of the bronchus intermedius.  It appeared pink and fleshy in color.  Endobronchial biopsies performed.  Washings performed.  I was able to advance the scope beyond the obstructing mass showing relatively normal airways.  The extent of the mass was approximately 1.5 cm in length down the airway.    Minimal bleeding.  Estimated blood loss less than 10 mL.    No immediate complications.    I did update the patient's son following the procedure.  I instructed him that we would probably have pathologic results back either Friday or Monday.  If we confirm sarcoid she may benefit from endobronchial debulking.  If this is a carcinoid tumor which is also a possibility I will have to discuss with my colleagues how to proceed.  Potentially a sleeve resection would be an option.    Cr Trujillo MD  Pulmonary and Critical Care  [phone] [facility]  [phone] Cell

## 2024-07-24 NOTE — ANESTHESIA PRE PROCEDURE
Department of Anesthesiology  Preprocedure Note       Name:  Sherley Reed   Age:  73 y.o.  :  1950                                          MRN:  [id number]         Date:  2024      Surgeon: Surgeon(s):  Cr Trujillo MD    Procedure: Procedure(s):  BRONCHOSCOPY    Medications prior to admission:   Prior to Admission medications    Medication Sig Start Date End Date Taking? Authorizing Provider   empagliflozin (JARDIANCE) 10 MG tablet Take 1 tablet by mouth daily 24   Leti Cid MD   dulaglutide (TRULICITY) 0.75 MG/0.5ML SOPN SC injection Inject 0.5 mLs into the skin once a week 24   Leti Cid MD   dilTIAZem (DILACOR XR) 180 MG extended release capsule Take 1 capsule by mouth daily    Jo Villagomez MD   potassium chloride (MICRO-K) 10 MEQ extended release capsule Take 2 capsules by mouth daily Patient can't swallow 20mg tabs    Jo Villagomez MD   dextromethorphan-guaiFENesin  MG TABS Take 1 tablet by mouth daily    Jo Villagomez MD   metroNIDAZOLE (METROCREAM) 0.75 % cream Apply topically 2 times daily 24  Jo Villagomez MD   ezetimibe (ZETIA) 10 MG tablet Take 1 tablet by mouth daily 4/3/23   Jo Villagomez MD   Melatonin 5 MG CAPS Take 1 tablet by mouth nightly    Jo Villagomez MD   carbidopa-levodopa (SINEMET CR)  MG per extended release tablet Take 2 tablets by mouth 3 times daily 10/31/22   Jo Villagomez MD   vitamin D (CHOLECALCIFEROL) 25 MCG (1000 UT) TABS tablet Take 1 tablet by mouth daily    Jo Villagomez MD   valACYclovir (VALTREX) 500 MG tablet Take 1 tablet by mouth daily 24   Jo Villagomez MD   polyethylene glycol (GLYCOLAX) 17 g packet Take 1 packet by mouth daily    Jo Villagomez MD   benzonatate (TESSALON) 100 MG capsule Take 1 capsule by mouth 3 times daily as needed for Cough 24   Mahesh Monique DO   cromolyn (INTAL) 20 MG/2ML nebulizer

## 2024-07-24 NOTE — H&P
HISTORY and PHYSICAL  [facility]       NAME:  Sherley Reed  MRN: [id number]   YOB: 1950   Date: 7/24/2024   Age: 73 y.o.  Gender: female       COMPLAINT AND PRESENT HISTORY:     Sherley Reed is 73 y.o.,  female, presents for BRONCHOSCOPY   Primary dx: Lung mass [R91.8].    HPI  Sherley Reed is 73 y.o.,  female, here for right lung mass scheduled for bronchoscopy.  Pt under went CT as below.  Pt denies any respiratory symptoms.    Pt denies pain/discomfort, Reports chronic cough.  Denies hemoptysis.  Pt denies fever/chills.  Denies recent or current chest pain/pressure, palpitations, SOB, headaches, dizziness, lower extremity edema.     Imaging CT OF THE CHEST WITH CONTRAST 7/2/2024  IMPRESSION:  1. Right lower lobe/right mainstem bronchus endobronchial mass extending into  the lung parenchyma measuring approximately 4.1 x 8 x 5.6 cm.  Findings are  concerning for neoplastic process.  Recommend PET-CT and/or tissue sampling.  2. Enlarged right hilar lymph node measuring approximately 1.8 cm.  3. Cholelithiasis without evidence of acute cholecystitis.    Review of additional significant medical hx:    Arrhythmia, bradycardia, HLD, HTN, Follows with [facility]  JANIE-CPAP, Asthma Follows with Dr Ronnie Monique Pulmonary  DM  Follows with PCP  Epileptic seizure  most recent seizure 3 weeks ago. Parkinson's Disease.  Follows with Dr Mitchell Neurology  (See chart for additional detail, including current medications /see ROS for current S/S):     NPO status: NPO  Medications taken TODAY (with sip of water): Cardizem  Anticoagulation status: ASA stopped 5 days ago    Personal hx of blood clot in right upper arm s/p fall with humerus fracture 18 months ago.  Denies personal hx of MRSA infection.  Denies any personal or family hx of previous complications w/anesthesia.  PAST MEDICAL HISTORY     Past Medical History:   Diagnosis Date    Achilles tendinitis       Heart sounds: Normal heart sounds. No murmur heard.  Pulmonary:      Effort: Pulmonary effort is normal. No respiratory distress.      Breath sounds: Normal breath sounds. No wheezing, rhonchi or rales.   Abdominal:      General: Bowel sounds are normal. There is no distension.      Tenderness: There is no abdominal tenderness. There is no guarding.   Musculoskeletal:         General: No swelling.      Cervical back: Normal range of motion.      Right lower leg: No edema.      Left lower leg: No edema.      Comments: Right hip pain with reduced ROM   Skin:     General: Skin is dry.   Neurological:      Mental Status: She is alert and oriented to person, place, and time.      Gait: Gait abnormal.   Psychiatric:         Mood and Affect: Mood normal.         Behavior: Behavior normal.                   PROVISIONAL DIAGNOSES / SURGERY:      Lung mass [R91.8]    BRONCHOSCOPY     Patient Active Problem List    Diagnosis Date Noted    Right lower lobe lung mass 06/26/2024    JANIE (obstructive sleep apnea) 06/06/2024    Chronic systolic (congestive) heart failure 06/06/2024    Idiopathic Parkinson's disease (HCC) 06/06/2024    Chronic constipation 06/06/2024    Mild intermittent asthma, uncomplicated 11/01/2023    Seizure-like activity (HCC) 01/22/2023    Bigeminy 07/17/2019    Age-related cataract 07/16/2019    Achilles tendinitis 07/16/2019    Adrenal hyperplasia (HCC) 07/16/2019    Allergic rhinitis 07/16/2019    Bradycardia 07/16/2019    Bursitis 07/16/2019    Candidiasis of mouth 07/16/2019    Cellulitis of lip 07/16/2019    Contusion of knee 07/16/2019    Diabetes mellitus (HCC) 07/16/2019    Epileptic seizure (HCC) 07/16/2019    Essential hypertension 07/16/2019    Numbness of hand 07/16/2019    Sarcoidosis 07/16/2019    Seizure (HCC) 07/16/2019    Upper respiratory infection 07/16/2019    Dyspnea 07/16/2019    Menopause present 04/03/2017    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

## 2024-07-25 LAB
CASE NUMBER:: NORMAL
MICROORGANISM SPEC CULT: NORMAL
MICROORGANISM/AGENT SPEC: NORMAL
SPECIMEN DESCRIPTION: NORMAL

## 2024-07-29 LAB
MICROORGANISM SPEC CULT: NORMAL
MICROORGANISM SPEC CULT: NORMAL
MICROORGANISM/AGENT SPEC: NORMAL
MICROORGANISM/AGENT SPEC: NORMAL
SPECIMEN DESCRIPTION: NORMAL
SPECIMEN DESCRIPTION: NORMAL
SURGICAL PATHOLOGY REPORT: NORMAL
SURGICAL PATHOLOGY REPORT: NORMAL

## 2024-07-30 ENCOUNTER — TELEPHONE (OUTPATIENT)
Dept: ONCOLOGY | Age: 74
End: 2024-07-30

## 2024-07-30 NOTE — TELEPHONE ENCOUNTER
Name: Sherley Reed  : 1950  MRN: [id number]    Oncology Navigation- Initial Note:  (Unknown)  Intake-  Contact Type: Telephone    Continuum of Care: Diagnosis/Active Treatment    Smoking hx: Never    Notes:   Navigator received assignment and pt. Following with Dr. Sobia Abbott F/U     Electronically signed by Varsha White RN on 2024 at 10:42 AM

## 2024-08-06 LAB
MICROORGANISM SPEC CULT: NORMAL
MICROORGANISM SPEC CULT: NORMAL
MICROORGANISM/AGENT SPEC: NORMAL
MICROORGANISM/AGENT SPEC: NORMAL
SPECIMEN DESCRIPTION: NORMAL

## 2024-08-08 ENCOUNTER — TELEPHONE (OUTPATIENT)
Dept: ONCOLOGY | Age: 74
End: 2024-08-08

## 2024-08-08 NOTE — TELEPHONE ENCOUNTER
Name: Sherley Reed  : 1950  MRN: [id number]    Oncology Navigation- Initial Note:    Intake-  Contact Type: Telephone    Continuum of Care: Diagnosis/Active Treatment    Smoking hx:     Notes: Navigator noting pt. Matt Mcneill F/U  with Dr. Trujillo.    Electronically signed by Varsha White RN on 2024 at 11:21 AM

## 2024-08-12 LAB
MICROORGANISM SPEC CULT: NORMAL
MICROORGANISM SPEC CULT: NORMAL
MICROORGANISM/AGENT SPEC: NORMAL
MICROORGANISM/AGENT SPEC: NORMAL
SPECIMEN DESCRIPTION: NORMAL
SPECIMEN DESCRIPTION: NORMAL

## 2024-09-03 LAB
MICROORGANISM SPEC CULT: NORMAL
MICROORGANISM/AGENT SPEC: NORMAL
SPECIMEN DESCRIPTION: NORMAL

## 2024-09-09 LAB
MICROORGANISM SPEC CULT: NORMAL
MICROORGANISM/AGENT SPEC: NORMAL
SPECIMEN DESCRIPTION: NORMAL

## 2024-09-22 DIAGNOSIS — E11.9 TYPE 2 DIABETES MELLITUS WITHOUT COMPLICATION, WITHOUT LONG-TERM CURRENT USE OF INSULIN (HCC): Primary | ICD-10-CM

## 2024-09-24 RX ORDER — DULAGLUTIDE 0.75 MG/.5ML
0.75 INJECTION, SOLUTION SUBCUTANEOUS WEEKLY
Qty: 2 ML | Refills: 0 | Status: SHIPPED | OUTPATIENT
Start: 2024-09-24

## 2024-09-25 ENCOUNTER — OFFICE VISIT (OUTPATIENT)
Dept: PULMONOLOGY | Age: 74
End: 2024-09-25
Payer: MEDICARE

## 2024-09-25 VITALS
DIASTOLIC BLOOD PRESSURE: 67 MMHG | WEIGHT: 235 LBS | HEIGHT: 65 IN | BODY MASS INDEX: 39.15 KG/M2 | TEMPERATURE: 97.2 F | RESPIRATION RATE: 18 BRPM | SYSTOLIC BLOOD PRESSURE: 137 MMHG | HEART RATE: 76 BPM

## 2024-09-25 DIAGNOSIS — J45.41 MODERATE PERSISTENT ASTHMA WITH EXACERBATION: ICD-10-CM

## 2024-09-25 DIAGNOSIS — R05.3 CHRONIC COUGH: ICD-10-CM

## 2024-09-25 DIAGNOSIS — G47.33 OSA ON CPAP: ICD-10-CM

## 2024-09-25 DIAGNOSIS — D3A.090 BRONCHIAL CARCINOID TUMORS: ICD-10-CM

## 2024-09-25 DIAGNOSIS — E66.01 CLASS 2 SEVERE OBESITY DUE TO EXCESS CALORIES WITH SERIOUS COMORBIDITY AND BODY MASS INDEX (BMI) OF 39.0 TO 39.9 IN ADULT: Primary | ICD-10-CM

## 2024-09-25 PROCEDURE — 99215 OFFICE O/P EST HI 40 MIN: CPT | Performed by: INTERNAL MEDICINE

## 2024-09-25 PROCEDURE — G8417 CALC BMI ABV UP PARAM F/U: HCPCS | Performed by: INTERNAL MEDICINE

## 2024-09-25 PROCEDURE — 1123F ACP DISCUSS/DSCN MKR DOCD: CPT | Performed by: INTERNAL MEDICINE

## 2024-09-25 PROCEDURE — 3075F SYST BP GE 130 - 139MM HG: CPT | Performed by: INTERNAL MEDICINE

## 2024-09-25 PROCEDURE — G8400 PT W/DXA NO RESULTS DOC: HCPCS | Performed by: INTERNAL MEDICINE

## 2024-09-25 PROCEDURE — 3078F DIAST BP <80 MM HG: CPT | Performed by: INTERNAL MEDICINE

## 2024-09-25 PROCEDURE — 1090F PRES/ABSN URINE INCON ASSESS: CPT | Performed by: INTERNAL MEDICINE

## 2024-09-25 PROCEDURE — 3017F COLORECTAL CA SCREEN DOC REV: CPT | Performed by: INTERNAL MEDICINE

## 2024-09-25 PROCEDURE — 1036F TOBACCO NON-USER: CPT | Performed by: INTERNAL MEDICINE

## 2024-09-25 PROCEDURE — G8427 DOCREV CUR MEDS BY ELIG CLIN: HCPCS | Performed by: INTERNAL MEDICINE

## 2024-09-25 ASSESSMENT — SLEEP AND FATIGUE QUESTIONNAIRES
HOW LIKELY ARE YOU TO NOD OFF OR FALL ASLEEP WHILE SITTING QUIETLY AFTER LUNCH WITHOUT ALCOHOL: WOULD NEVER DOZE
HOW LIKELY ARE YOU TO NOD OFF OR FALL ASLEEP WHILE SITTING AND TALKING TO SOMEONE: WOULD NEVER DOZE
HOW LIKELY ARE YOU TO NOD OFF OR FALL ASLEEP WHILE SITTING INACTIVE IN A PUBLIC PLACE: WOULD NEVER DOZE
HOW LIKELY ARE YOU TO NOD OFF OR FALL ASLEEP WHILE SITTING AND READING: SLIGHT CHANCE OF DOZING
HOW LIKELY ARE YOU TO NOD OFF OR FALL ASLEEP WHILE WATCHING TV: SLIGHT CHANCE OF DOZING
HOW LIKELY ARE YOU TO NOD OFF OR FALL ASLEEP WHEN YOU ARE A PASSENGER IN A CAR FOR AN HOUR WITHOUT A BREAK: WOULD NEVER DOZE
HOW LIKELY ARE YOU TO NOD OFF OR FALL ASLEEP WHILE LYING DOWN TO REST IN THE AFTERNOON WHEN CIRCUMSTANCES PERMIT: WOULD NEVER DOZE
ESS TOTAL SCORE: 2
HOW LIKELY ARE YOU TO NOD OFF OR FALL ASLEEP IN A CAR, WHILE STOPPED FOR A FEW MINUTES IN TRAFFIC: WOULD NEVER DOZE

## 2024-11-22 NOTE — TELEPHONE ENCOUNTER
RECEIVED ANOTHER PA FOR CROMOLYN SODIUM NEBULIZER SOLUTION. I PHONED JOAN AGAIN SPOKE WITH DANIELLE INFORMED HER THEY ARE BILLING THIS WRONG AND IT SHOULD BE BILLED UNDER MEDICARE PART \"B\" AS IN BOY NOT MEDICARE PART \"D\" AS IN DOG. Confirmed with Danielle that NO PA was needed. She stated no. not applicable (Male)

## 2024-11-26 DIAGNOSIS — E11.9 TYPE 2 DIABETES MELLITUS WITHOUT COMPLICATION, WITHOUT LONG-TERM CURRENT USE OF INSULIN (HCC): Primary | ICD-10-CM

## 2024-11-29 RX ORDER — DULAGLUTIDE 0.75 MG/.5ML
INJECTION, SOLUTION SUBCUTANEOUS
Qty: 2 ML | Refills: 0 | Status: SHIPPED | OUTPATIENT
Start: 2024-11-29

## 2024-12-04 ENCOUNTER — OFFICE VISIT (OUTPATIENT)
Age: 74
End: 2024-12-04
Payer: MEDICARE

## 2024-12-04 VITALS
RESPIRATION RATE: 16 BRPM | HEIGHT: 65 IN | WEIGHT: 239 LBS | TEMPERATURE: 97.8 F | SYSTOLIC BLOOD PRESSURE: 149 MMHG | HEART RATE: 68 BPM | DIASTOLIC BLOOD PRESSURE: 48 MMHG | BODY MASS INDEX: 39.82 KG/M2

## 2024-12-04 DIAGNOSIS — Z78.9 NONSMOKER: ICD-10-CM

## 2024-12-04 DIAGNOSIS — I11.0 HYPERTENSIVE HEART DISEASE WITH CHRONIC SYSTOLIC CONGESTIVE HEART FAILURE (HCC): ICD-10-CM

## 2024-12-04 DIAGNOSIS — E11.9 TYPE 2 DIABETES MELLITUS WITHOUT COMPLICATION, WITHOUT LONG-TERM CURRENT USE OF INSULIN (HCC): Primary | ICD-10-CM

## 2024-12-04 DIAGNOSIS — I50.22 HYPERTENSIVE HEART DISEASE WITH CHRONIC SYSTOLIC CONGESTIVE HEART FAILURE (HCC): ICD-10-CM

## 2024-12-04 PROBLEM — J06.9 UPPER RESPIRATORY INFECTION: Status: RESOLVED | Noted: 2019-07-16 | Resolved: 2024-12-04

## 2024-12-04 PROBLEM — K13.0 CELLULITIS OF LIP: Status: RESOLVED | Noted: 2019-07-16 | Resolved: 2024-12-04

## 2024-12-04 PROBLEM — R56.9 SEIZURE-LIKE ACTIVITY (HCC): Chronic | Status: RESOLVED | Noted: 2023-01-22 | Resolved: 2024-12-04

## 2024-12-04 PROBLEM — B37.0 CANDIDIASIS OF MOUTH: Status: RESOLVED | Noted: 2019-07-16 | Resolved: 2024-12-04

## 2024-12-04 PROBLEM — S80.00XA CONTUSION OF KNEE: Status: RESOLVED | Noted: 2019-07-16 | Resolved: 2024-12-04

## 2024-12-04 LAB — HBA1C MFR BLD: 6.8 %

## 2024-12-04 PROCEDURE — 2022F DILAT RTA XM EVC RTNOPTHY: CPT | Performed by: FAMILY MEDICINE

## 2024-12-04 PROCEDURE — 1123F ACP DISCUSS/DSCN MKR DOCD: CPT | Performed by: FAMILY MEDICINE

## 2024-12-04 PROCEDURE — 1036F TOBACCO NON-USER: CPT | Performed by: FAMILY MEDICINE

## 2024-12-04 PROCEDURE — 99212 OFFICE O/P EST SF 10 MIN: CPT

## 2024-12-04 PROCEDURE — 99214 OFFICE O/P EST MOD 30 MIN: CPT | Performed by: FAMILY MEDICINE

## 2024-12-04 PROCEDURE — G8427 DOCREV CUR MEDS BY ELIG CLIN: HCPCS | Performed by: FAMILY MEDICINE

## 2024-12-04 PROCEDURE — G8417 CALC BMI ABV UP PARAM F/U: HCPCS | Performed by: FAMILY MEDICINE

## 2024-12-04 PROCEDURE — 83036 HEMOGLOBIN GLYCOSYLATED A1C: CPT

## 2024-12-04 PROCEDURE — 3044F HG A1C LEVEL LT 7.0%: CPT | Performed by: FAMILY MEDICINE

## 2024-12-04 PROCEDURE — G8484 FLU IMMUNIZE NO ADMIN: HCPCS | Performed by: FAMILY MEDICINE

## 2024-12-04 PROCEDURE — 1159F MED LIST DOCD IN RCRD: CPT | Performed by: FAMILY MEDICINE

## 2024-12-04 PROCEDURE — 3017F COLORECTAL CA SCREEN DOC REV: CPT | Performed by: FAMILY MEDICINE

## 2024-12-04 PROCEDURE — 1090F PRES/ABSN URINE INCON ASSESS: CPT | Performed by: FAMILY MEDICINE

## 2024-12-04 PROCEDURE — G8400 PT W/DXA NO RESULTS DOC: HCPCS | Performed by: FAMILY MEDICINE

## 2024-12-04 RX ORDER — DILTIAZEM HYDROCHLORIDE 180 MG/1
180 CAPSULE, EXTENDED RELEASE ORAL DAILY
Qty: 90 CAPSULE | Refills: 1 | Status: SHIPPED | OUTPATIENT
Start: 2024-12-04

## 2024-12-04 RX ORDER — DULAGLUTIDE 0.75 MG/.5ML
0.75 INJECTION, SOLUTION SUBCUTANEOUS WEEKLY
Qty: 2 ML | Refills: 1 | Status: SHIPPED | OUTPATIENT
Start: 2024-12-04

## 2024-12-04 RX ORDER — DULAGLUTIDE 0.75 MG/.5ML
0.75 INJECTION, SOLUTION SUBCUTANEOUS WEEKLY
Qty: 2 ML | Refills: 1 | Status: SHIPPED
Start: 2024-12-04 | End: 2024-12-04

## 2024-12-04 NOTE — PROGRESS NOTES
[facility]  [address] [place] [address]  Phone: [phone]  Fax: [phone]      Date of Visit:  2024  Patient Name: Sherley Reed   Patient :  1950     Chief Complaint   Patient presents with    Follow-up     Med check-Jardiance - stopped it due to itching, Trulicity- doing ok    Refill Dilt - can't see cardio til 2025       HPI:     Sherley Reed is a 73 y.o. female who presents today to discuss Trulicity. She is here mainly for a medication check. She had started jardiance but was suffering from severe pruritus and subsequently stopped the medication. She has been doing well on the trulicity with out any side effects she last had her Hba1c check in  which was 8.1 today it is 6.8. there has been great glycemic control with this. Did discuss increasing and we will hold off at this time and can see if she gets any further benefit. She will likely need to increase the dose to get the added benefit of weight loss and cardiovascular protecting in the future. She also need refill at this time of her cardizem. Blood pressure is quite well controlled. Did discuss that she should check at home to make sure her diastolic number is not going too low or she will not be perfusing her heart well. She is aware of this and will check. Otherwise no other acute concerns that she would like to discuss at this time.     I personally reviewed the patient's past medical history, current medications, allergies, surgical history, family history and social history.  Updates were made as necessary.    REVIEW OF SYSTEM      Review of Systems   Constitutional:  Negative for chills, diaphoresis, fatigue and fever.   HENT: Negative.     Respiratory:  Negative for cough and shortness of breath.    Cardiovascular:  Negative for chest pain, palpitations and leg swelling.   Gastrointestinal:  Negative for abdominal pain,

## 2024-12-04 NOTE — PATIENT INSTRUCTIONS
Follow up in 3 months for DM   Continue with the 0.75mg dose of the trulicity as your hemoglobin A1c has come down to 6.8  Will send in refill for your diltiazem  Please have [facility] send over records   Please call the office if you have any questions or concerns  If you have not signed up for [employer] please sign up, you can reach out to me, see your lab results and request refills for medications    Appointments can be made via [employer] as well, with the standard coming 15 minutes prior to appointments  Thank you for your visit today!

## 2025-01-06 ENCOUNTER — PROCEDURE VISIT (OUTPATIENT)
Dept: PULMONOLOGY | Age: 75
End: 2025-01-06
Payer: MEDICARE

## 2025-01-06 ENCOUNTER — TRANSCRIBE ORDERS (OUTPATIENT)
Dept: ADMINISTRATIVE | Age: 75
End: 2025-01-06

## 2025-01-06 VITALS — WEIGHT: 241 LBS | BODY MASS INDEX: 38.73 KG/M2 | HEART RATE: 74 BPM | OXYGEN SATURATION: 98 % | HEIGHT: 66 IN

## 2025-01-06 DIAGNOSIS — D3A.090 BRONCHIAL CARCINOID TUMORS: Primary | ICD-10-CM

## 2025-01-06 DIAGNOSIS — D3A.090 CARCINOID TUMOR OF BRONCHUS, UNSPECIFIED WHETHER MALIGNANT: Primary | ICD-10-CM

## 2025-01-06 LAB
DLCO %PRED: NORMAL
DLCO PRED: NORMAL
DLCO/VA %PRED: NORMAL
DLCO/VA PRED: NORMAL
DLCO/VA: NORMAL
DLCO: NORMAL
EXPIRATORY TIME-POST: NORMAL
EXPIRATORY TIME: NORMAL
FEF 25-75 %CHNG: NORMAL
FEF 25-75 POST %PRED: NORMAL
FEF 25-75% %PRED-PRE: NORMAL
FEF 25-75% PRED: NORMAL
FEF 25-75-POST: NORMAL
FEF 25-75-PRE: NORMAL
FEV1 %PRED-POST: NORMAL
FEV1 %PRED-PRE: NORMAL
FEV1 PRED: NORMAL
FEV1-POST: NORMAL
FEV1-PRE: NORMAL
FEV1/FVC %PRED-POST: NORMAL
FEV1/FVC %PRED-PRE: NORMAL
FEV1/FVC PRED: NORMAL
FEV1/FVC-POST: NORMAL
FEV1/FVC-PRE: NORMAL
FVC %PRED-POST: NORMAL
FVC %PRED-PRE: NORMAL
FVC PRED: NORMAL
FVC-POST: NORMAL
FVC-PRE: NORMAL
GAW %PRED: NORMAL
GAW PRED: NORMAL
GAW: NORMAL
IC PRE %PRED: NORMAL
IC PRED: NORMAL
IC: NORMAL
MEP: NORMAL
MIP: NORMAL
MVV %PRED-PRE: NORMAL
MVV PRED: NORMAL
MVV-PRE: NORMAL
PEF %PRED-POST: NORMAL
PEF %PRED-PRE: NORMAL
PEF PRED: NORMAL
PEF%CHNG: NORMAL
PEF-POST: NORMAL
PEF-PRE: NORMAL
RAW %PRED: NORMAL
RAW PRED: NORMAL
RAW: NORMAL
RV PRE %PRED: NORMAL
RV PRED: NORMAL
RV: NORMAL
SVC %PRED: NORMAL
SVC PRED: NORMAL
SVC: NORMAL
TLC PRE %PRED: NORMAL
TLC PRED: NORMAL
TLC: NORMAL
VA %PRED: NORMAL
VA PRED: NORMAL
VA: NORMAL
VTG %PRED: NORMAL
VTG PRED: NORMAL
VTG: NORMAL

## 2025-01-06 PROCEDURE — 94010 BREATHING CAPACITY TEST: CPT | Performed by: INTERNAL MEDICINE

## 2025-01-06 PROCEDURE — 94726 PLETHYSMOGRAPHY LUNG VOLUMES: CPT | Performed by: INTERNAL MEDICINE

## 2025-01-06 PROCEDURE — 94729 DIFFUSING CAPACITY: CPT | Performed by: INTERNAL MEDICINE

## 2025-01-07 ENCOUNTER — TRANSCRIBE ORDERS (OUTPATIENT)
Dept: ADMINISTRATIVE | Age: 75
End: 2025-01-07

## 2025-01-07 DIAGNOSIS — D3A.090 CARCINOID TUMOR OF BRONCHUS, UNSPECIFIED WHETHER MALIGNANT: Primary | ICD-10-CM

## 2025-01-07 NOTE — PROGRESS NOTES
Pulmonary function test.  Date of study 01/06/2025.      Spirometry shows FVC is 2.0 770% predicted suggestive of mild restrictive ventral impairment.  FEV1 is 1.61 71% predicted.  Diffusion FVC ratio is normal without evidence of obstruction.  Lung volumes shows total lung capacity is 3.80 74% predicted consistent with mild restrictive ventilatory impairment likely extraparenchymal.  Diffusion capacity is 16.40 93% predicted which is within normal limit      Impression:      This pulmonary function test is consistent with mild restrictive ventilatory impairment which could be extraparenchymal.  Clinical correlation is recommended.

## 2025-01-20 ENCOUNTER — HOSPITAL ENCOUNTER (OUTPATIENT)
Dept: CT IMAGING | Age: 75
Discharge: HOME | End: 2025-01-22
Payer: MEDICARE

## 2025-01-20 DIAGNOSIS — D3A.090 CARCINOID TUMOR OF BRONCHUS, UNSPECIFIED WHETHER MALIGNANT: ICD-10-CM

## 2025-01-20 PROCEDURE — 2500000003 HC RX 250 WO HCPCS: Performed by: INTERNAL MEDICINE

## 2025-01-20 PROCEDURE — 82565 ASSAY OF CREATININE: CPT

## 2025-01-20 PROCEDURE — 6360000004 HC RX CONTRAST MEDICATION: Performed by: INTERNAL MEDICINE

## 2025-01-20 PROCEDURE — 71260 CT THORAX DX C+: CPT

## 2025-01-20 RX ORDER — IOPAMIDOL 755 MG/ML
75 INJECTION, SOLUTION INTRAVASCULAR
Status: COMPLETED | OUTPATIENT
Start: 2025-01-20 | End: 2025-01-20

## 2025-01-20 RX ORDER — 0.9 % SODIUM CHLORIDE 0.9 %
80 INTRAVENOUS SOLUTION INTRAVENOUS ONCE
Status: DISCONTINUED | OUTPATIENT
Start: 2025-01-20 | End: 2025-01-23 | Stop reason: HOSPADM

## 2025-01-20 RX ORDER — SODIUM CHLORIDE 0.9 % (FLUSH) 0.9 %
10 SYRINGE (ML) INJECTION ONCE
Status: COMPLETED | OUTPATIENT
Start: 2025-01-20 | End: 2025-01-20

## 2025-01-20 RX ADMIN — Medication 80 ML: at 14:33

## 2025-01-20 RX ADMIN — IOPAMIDOL 75 ML: 755 INJECTION, SOLUTION INTRAVENOUS at 14:32

## 2025-01-20 RX ADMIN — SODIUM CHLORIDE, PRESERVATIVE FREE 10 ML: 5 INJECTION INTRAVENOUS at 14:33

## 2025-01-22 LAB — CREAT BLD-MCNC: 0.9 MG/DL (ref 0.6–1.4)

## 2025-01-29 ENCOUNTER — HOSPITAL ENCOUNTER (OUTPATIENT)
Dept: NUCLEAR MEDICINE | Age: 75
Discharge: HOME | End: 2025-01-31
Payer: MEDICARE

## 2025-01-29 DIAGNOSIS — D3A.090 CARCINOID TUMOR OF BRONCHUS, UNSPECIFIED WHETHER MALIGNANT: ICD-10-CM

## 2025-01-29 PROCEDURE — 2500000003 HC RX 250 WO HCPCS: Performed by: INTERNAL MEDICINE

## 2025-01-29 PROCEDURE — A9592 HC RX DIAGNOSTIC RADIOPHARMACEUTICAL: HCPCS | Performed by: INTERNAL MEDICINE

## 2025-01-29 PROCEDURE — 78815 PET IMAGE W/CT SKULL-THIGH: CPT

## 2025-01-29 PROCEDURE — 3430000000 HC RX DIAGNOSTIC RADIOPHARMACEUTICAL: Performed by: INTERNAL MEDICINE

## 2025-01-29 RX ORDER — SODIUM CHLORIDE 0.9 % (FLUSH) 0.9 %
10 SYRINGE (ML) INJECTION ONCE
Status: COMPLETED | OUTPATIENT
Start: 2025-01-29 | End: 2025-01-29

## 2025-01-29 RX ADMIN — SODIUM CHLORIDE, PRESERVATIVE FREE 10 ML: 5 INJECTION INTRAVENOUS at 12:51

## 2025-01-29 RX ADMIN — COPPER CU 64 DOTATATE 4.43 MILLICURIE: 1 INJECTION, SOLUTION INTRAVENOUS at 12:51

## 2025-02-06 ENCOUNTER — TELEPHONE (OUTPATIENT)
Dept: PULMONOLOGY | Age: 75
End: 2025-02-06

## 2025-02-06 NOTE — TELEPHONE ENCOUNTER
Writer spoke with Claudia at Dr Cid's (PCP) office and informed her of Dr Devlin's message. Claudia voiced understanding and stated she would route message to Dr Cid for review (this was done per chart).     Dr Devlin, patient is scheduled for follow up with you on 2/24/25. Thank you.

## 2025-02-06 NOTE — TELEPHONE ENCOUNTER
Message    Ninfa I saw her once in September when she came for second opinion.  She was seen by Dr Main montes for pulm  who had done the bronchoscopy [facility] and did refer him to interventional pulmonology history of [place] she had procedure done by [facility] and went she did see me for second opinion she was referred by Dr. Montes's office already for [facility] referral.  She has not been seen since September 2024.  There is apparently a recent PET scan was done and I am not sure who did the PET scan but anyway   Pulmonology in [facility] discussed with the patient and they do not think that the patient need any surgical intervention.  But they recommended that the patient should get thyroid workup/ultrasound or biopsy and GI workup as there was some uptake in stomach and recommended by radiology.  Please send the PET scan and [facility] recommendation to primary care office call them and let them know that they have to workup for the thyroid and GI recommended by [facility].  They also recommended patient to be seen oncology.   According to their note patient has an appointment please check her appointment when is her appointment with me ?  Let me know   ----- Message -----   From: Ninfa Alamo   Sent: 2/6/2025   1:20 PM EST   To: Sid Devlin MD   Subject: Import                                            Imported by Ninfa Alamo on 2/6/2025 at  1:20 PM to the following: Appointment on 2/24/2025 with Sid Devlin MD [[id number]]     Media Information                      Notice:       Document on 2/6/2025  1:20 PM by Ninfa Alamo: 2/6/25 Dr Jesse Encarnacion Pulmonary                   Description       2/6/25 Dr Jesse Encarnacion Pulmonary              Patient       Sherley Stratton              Document Type 1       Progress Notes

## 2025-02-06 NOTE — TELEPHONE ENCOUNTER
Calling in regards to note above- recommended that the patient should get thyroid workup/ultrasound or biopsy and GI workup. They also recommended patient to be seen oncology.     Please advise

## 2025-02-13 NOTE — TELEPHONE ENCOUNTER
Message  Received: Yesterday  Sid Devlin MD Reams, Stephanie Okay keep the appointment on 02/24/2025

## 2025-02-24 ENCOUNTER — OFFICE VISIT (OUTPATIENT)
Dept: PULMONOLOGY | Age: 75
End: 2025-02-24
Payer: MEDICARE

## 2025-02-24 VITALS
OXYGEN SATURATION: 98 % | WEIGHT: 238.2 LBS | SYSTOLIC BLOOD PRESSURE: 138 MMHG | DIASTOLIC BLOOD PRESSURE: 72 MMHG | RESPIRATION RATE: 18 BRPM | HEIGHT: 65 IN | HEART RATE: 74 BPM | BODY MASS INDEX: 39.69 KG/M2

## 2025-02-24 DIAGNOSIS — D3A.090: Primary | ICD-10-CM

## 2025-02-24 DIAGNOSIS — J45.41 MODERATE PERSISTENT ASTHMA WITH EXACERBATION: ICD-10-CM

## 2025-02-24 DIAGNOSIS — E66.01 CLASS 2 SEVERE OBESITY DUE TO EXCESS CALORIES WITH SERIOUS COMORBIDITY AND BODY MASS INDEX (BMI) OF 39.0 TO 39.9 IN ADULT: ICD-10-CM

## 2025-02-24 DIAGNOSIS — C7A.8 NEUROENDOCRINE CARCINOMA OF LUNG (HCC): ICD-10-CM

## 2025-02-24 DIAGNOSIS — R05.3 CHRONIC COUGH: ICD-10-CM

## 2025-02-24 DIAGNOSIS — G47.33 OSA ON CPAP: ICD-10-CM

## 2025-02-24 DIAGNOSIS — E66.812 CLASS 2 SEVERE OBESITY DUE TO EXCESS CALORIES WITH SERIOUS COMORBIDITY AND BODY MASS INDEX (BMI) OF 39.0 TO 39.9 IN ADULT: ICD-10-CM

## 2025-02-24 DIAGNOSIS — R94.8 ABNORMAL GASTROINTESTINAL PET SCAN: ICD-10-CM

## 2025-02-24 PROCEDURE — G8400 PT W/DXA NO RESULTS DOC: HCPCS | Performed by: INTERNAL MEDICINE

## 2025-02-24 PROCEDURE — G8427 DOCREV CUR MEDS BY ELIG CLIN: HCPCS | Performed by: INTERNAL MEDICINE

## 2025-02-24 PROCEDURE — 1090F PRES/ABSN URINE INCON ASSESS: CPT | Performed by: INTERNAL MEDICINE

## 2025-02-24 PROCEDURE — 1123F ACP DISCUSS/DSCN MKR DOCD: CPT | Performed by: INTERNAL MEDICINE

## 2025-02-24 PROCEDURE — 3017F COLORECTAL CA SCREEN DOC REV: CPT | Performed by: INTERNAL MEDICINE

## 2025-02-24 PROCEDURE — G8417 CALC BMI ABV UP PARAM F/U: HCPCS | Performed by: INTERNAL MEDICINE

## 2025-02-24 PROCEDURE — 1036F TOBACCO NON-USER: CPT | Performed by: INTERNAL MEDICINE

## 2025-02-24 PROCEDURE — 99215 OFFICE O/P EST HI 40 MIN: CPT | Performed by: INTERNAL MEDICINE

## 2025-02-24 PROCEDURE — 1159F MED LIST DOCD IN RCRD: CPT | Performed by: INTERNAL MEDICINE

## 2025-02-24 RX ORDER — FUROSEMIDE 20 MG/1
TABLET ORAL
COMMUNITY
Start: 2025-02-10

## 2025-02-24 RX ORDER — POTASSIUM CHLORIDE 750 MG/1
TABLET, EXTENDED RELEASE ORAL
COMMUNITY
Start: 2024-12-05

## 2025-02-24 RX ORDER — FLUTICASONE PROPIONATE 50 MCG
2 SPRAY, SUSPENSION (ML) NASAL DAILY
COMMUNITY
Start: 2024-11-06

## 2025-02-24 ASSESSMENT — SLEEP AND FATIGUE QUESTIONNAIRES
HOW LIKELY ARE YOU TO NOD OFF OR FALL ASLEEP WHILE SITTING AND TALKING TO SOMEONE: WOULD NEVER DOZE
HOW LIKELY ARE YOU TO NOD OFF OR FALL ASLEEP WHILE WATCHING TV: SLIGHT CHANCE OF DOZING
HOW LIKELY ARE YOU TO NOD OFF OR FALL ASLEEP WHILE LYING DOWN TO REST IN THE AFTERNOON WHEN CIRCUMSTANCES PERMIT: MODERATE CHANCE OF DOZING
HOW LIKELY ARE YOU TO NOD OFF OR FALL ASLEEP WHEN YOU ARE A PASSENGER IN A CAR FOR AN HOUR WITHOUT A BREAK: SLIGHT CHANCE OF DOZING
HOW LIKELY ARE YOU TO NOD OFF OR FALL ASLEEP IN A CAR, WHILE STOPPED FOR A FEW MINUTES IN TRAFFIC: WOULD NEVER DOZE
ESS TOTAL SCORE: 6
HOW LIKELY ARE YOU TO NOD OFF OR FALL ASLEEP WHILE SITTING INACTIVE IN A PUBLIC PLACE: WOULD NEVER DOZE
HOW LIKELY ARE YOU TO NOD OFF OR FALL ASLEEP WHILE SITTING QUIETLY AFTER LUNCH WITHOUT ALCOHOL: SLIGHT CHANCE OF DOZING
HOW LIKELY ARE YOU TO NOD OFF OR FALL ASLEEP WHILE SITTING AND READING: SLIGHT CHANCE OF DOZING

## 2025-02-24 NOTE — PROGRESS NOTES
OUTPATIENT PULMONARY PROGRESS NOTE      Patient:  Sherley Reed  MRN: [id number]      [facility] Leti Cid MD    HISTORY OF PRESENT ILLNESS:   The patient is a 74 y.o. female with history of asthma, obstructive sleep apnea on CPAP, history of sarcoidosis and had been followed by Dr. Monique she was seen last time on 02/26/2024 her next appointment was in January 2025 in the office.  Patient had been recently seen by Dr. Eric Trujillo and also by Dr. 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    She was initially seen on 09/25/2024 in our office as she came for second opinion.  She was initially seen by pulmonologist  Dr. Main Trujillo, who performed a bronchoscopy and referred her to the [facility] for further evaluation of her bronchial carcinoid tumor.  She was evaluated by interventional pulmonology in [facility] which she had some debulking done of bronchus intermedius endobronchial carcinoid.  She was referred by Dr. Trujillo to [facility] for further evaluation  At the [facility], she was evaluated by an interventional pulmonologist via a virtual visit.  She had a PET scan done on 01/29/2025 and according to interventional pulmonology note from [facility], the majority of her tumor was found to be outside the airway, and a repeat CT scan confirmed that the airway was patent. Consequently, therapeutic bronchoscopy was not deemed necessary at that time by interventional pulmonology.    A PET scan on 01/20/2025 shows a large right infra hilar medial mass which was also seen in the CT scan with necrosis in the center but the bronchus intermedius endobronchial mass

## 2025-02-25 PROBLEM — R94.6 ABNORMAL THYROID UPTAKE: Status: ACTIVE | Noted: 2025-02-25

## 2025-02-25 PROBLEM — D3A.090: Status: ACTIVE | Noted: 2025-02-25

## 2025-02-25 PROBLEM — R94.8 ABNORMAL GASTROINTESTINAL PET SCAN: Status: ACTIVE | Noted: 2025-02-25

## 2025-02-26 ENCOUNTER — OFFICE VISIT (OUTPATIENT)
Age: 75
End: 2025-02-26
Payer: MEDICARE

## 2025-02-26 VITALS
SYSTOLIC BLOOD PRESSURE: 133 MMHG | TEMPERATURE: 98.2 F | HEART RATE: 70 BPM | RESPIRATION RATE: 12 BRPM | DIASTOLIC BLOOD PRESSURE: 51 MMHG | WEIGHT: 235.6 LBS | BODY MASS INDEX: 39.21 KG/M2

## 2025-02-26 DIAGNOSIS — K59.01 SLOW TRANSIT CONSTIPATION: ICD-10-CM

## 2025-02-26 DIAGNOSIS — R94.6 ABNORMAL THYROID UPTAKE: ICD-10-CM

## 2025-02-26 DIAGNOSIS — Z13.31 POSITIVE DEPRESSION SCREENING: ICD-10-CM

## 2025-02-26 DIAGNOSIS — Z78.9 NONSMOKER: ICD-10-CM

## 2025-02-26 DIAGNOSIS — D3A.090: Primary | ICD-10-CM

## 2025-02-26 DIAGNOSIS — R94.8 ABNORMAL GASTROINTESTINAL PET SCAN: ICD-10-CM

## 2025-02-26 PROCEDURE — 99214 OFFICE O/P EST MOD 30 MIN: CPT | Performed by: FAMILY MEDICINE

## 2025-02-26 PROCEDURE — 1159F MED LIST DOCD IN RCRD: CPT | Performed by: FAMILY MEDICINE

## 2025-02-26 PROCEDURE — G8400 PT W/DXA NO RESULTS DOC: HCPCS | Performed by: FAMILY MEDICINE

## 2025-02-26 PROCEDURE — G8427 DOCREV CUR MEDS BY ELIG CLIN: HCPCS | Performed by: FAMILY MEDICINE

## 2025-02-26 PROCEDURE — G8417 CALC BMI ABV UP PARAM F/U: HCPCS | Performed by: FAMILY MEDICINE

## 2025-02-26 PROCEDURE — 96127 BRIEF EMOTIONAL/BEHAV ASSMT: CPT

## 2025-02-26 PROCEDURE — 3017F COLORECTAL CA SCREEN DOC REV: CPT | Performed by: FAMILY MEDICINE

## 2025-02-26 PROCEDURE — 1090F PRES/ABSN URINE INCON ASSESS: CPT | Performed by: FAMILY MEDICINE

## 2025-02-26 PROCEDURE — 1123F ACP DISCUSS/DSCN MKR DOCD: CPT | Performed by: FAMILY MEDICINE

## 2025-02-26 PROCEDURE — 1036F TOBACCO NON-USER: CPT | Performed by: FAMILY MEDICINE

## 2025-02-26 PROCEDURE — 96127 BRIEF EMOTIONAL/BEHAV ASSMT: CPT | Performed by: FAMILY MEDICINE

## 2025-02-26 RX ORDER — SENNA AND DOCUSATE SODIUM 50; 8.6 MG/1; MG/1
1 TABLET, FILM COATED ORAL DAILY
Qty: 30 TABLET | Refills: 0 | Status: SHIPPED | OUTPATIENT
Start: 2025-02-26 | End: 2025-03-28

## 2025-02-26 SDOH — ECONOMIC STABILITY: FOOD INSECURITY: WITHIN THE PAST 12 MONTHS, THE FOOD YOU BOUGHT JUST DIDN'T LAST AND YOU DIDN'T HAVE MONEY TO GET MORE.: NEVER TRUE

## 2025-02-26 SDOH — ECONOMIC STABILITY: FOOD INSECURITY: WITHIN THE PAST 12 MONTHS, YOU WORRIED THAT YOUR FOOD WOULD RUN OUT BEFORE YOU GOT MONEY TO BUY MORE.: NEVER TRUE

## 2025-02-26 ASSESSMENT — PATIENT HEALTH QUESTIONNAIRE - PHQ9
8. MOVING OR SPEAKING SO SLOWLY THAT OTHER PEOPLE COULD HAVE NOTICED. OR THE OPPOSITE, BEING SO FIGETY OR RESTLESS THAT YOU HAVE BEEN MOVING AROUND A LOT MORE THAN USUAL: NOT AT ALL
4. FEELING TIRED OR HAVING LITTLE ENERGY: NOT AT ALL
9. THOUGHTS THAT YOU WOULD BE BETTER OFF DEAD, OR OF HURTING YOURSELF: NOT AT ALL
SUM OF ALL RESPONSES TO PHQ QUESTIONS 1-9: 10
SUM OF ALL RESPONSES TO PHQ QUESTIONS 1-9: 10
1. LITTLE INTEREST OR PLEASURE IN DOING THINGS: MORE THAN HALF THE DAYS
SUM OF ALL RESPONSES TO PHQ QUESTIONS 1-9: 10
SUM OF ALL RESPONSES TO PHQ QUESTIONS 1-9: 10
2. FEELING DOWN, DEPRESSED OR HOPELESS: MORE THAN HALF THE DAYS
3. TROUBLE FALLING OR STAYING ASLEEP: NOT AT ALL
5. POOR APPETITE OR OVEREATING: NEARLY EVERY DAY
10. IF YOU CHECKED OFF ANY PROBLEMS, HOW DIFFICULT HAVE THESE PROBLEMS MADE IT FOR YOU TO DO YOUR WORK, TAKE CARE OF THINGS AT HOME, OR GET ALONG WITH OTHER PEOPLE: SOMEWHAT DIFFICULT
7. TROUBLE CONCENTRATING ON THINGS, SUCH AS READING THE NEWSPAPER OR WATCHING TELEVISION: NOT AT ALL
6. FEELING BAD ABOUT YOURSELF - OR THAT YOU ARE A FAILURE OR HAVE LET YOURSELF OR YOUR FAMILY DOWN: NEARLY EVERY DAY

## 2025-02-26 NOTE — PROGRESS NOTES
[facility]  [address] [place] [address]  Phone: [phone]  Fax: [phone]      Date of Visit: 2025   Patient Name: Sherley Reed   Patient :  1950     ASSESSMENT/PLAN   1. Bronchial carcinoid tumors (HCC)  2. Abnormal gastrointestinal PET scan  3. Abnormal thyroid uptake  Sherley is a 74-year-old female with a past medical history of Parkinson's disease, diabetes, sarcoidosis that presents today to follow-up regarding the lung mass found on CT imaging of the abdomen last year in July.  Since that time she has 2 PET scans, 2 CT imaging studies and has seen pulmonology and oncology.  Biopsy results done by bronchoscopy have both shown evidence for carcinoid tumor.  2 other areas that lit up however on the PET scan included the gastric wall and the thyroid.  Today we will get the thyroid scan and hormone levels in order to investigate the thyroid.  She has been given a follow-up referral for gastroenterology to investigate the gastric wall.  She has a follow-up appointment with oncology on Monday who she wants to discuss whether or not she needs the gastroenterology referral or if she does follow-up with them.     See communications section to see instructions provided to patient  No follow-ups on file.    Patient was seen and discussed with Hema Desai MD.  Electronically signed by Leti Cid MD on 2025 at 2:28 PM  HPI    Sherley Reed is a 74 y.o. female,  has a past medical history of Achilles tendinitis, Acid reflux, Adrenal hyperplasia, Age-related cataract, Allergic rhinitis, Arrhythmia, Asthma, Bigeminy, Bradycardia, Bursitis, Cataract, Cellulitis of lip, Chronic cough, Closed fracture of right humerus, Depression, Diabetes mellitus (HCC), Dyspnea, Epileptic seizure (HCC), GERD (gastroesophageal reflux disease), Hemoptysis, High cholesterol, Hypertension, Lung nodule, Menopause present,

## 2025-02-26 NOTE — PATIENT INSTRUCTIONS
Thank you for following up with us at [facility] outpatient residency clinic! It was a pleasure to meet you today!     Today we discussed the below as next steps in your care:  For the abnormal results seen on your thyroid we will get a thyroid ultrasound and thyroid hormone  Please follow-up with oncology on Monday  Because you are having issues with constipation I have placed an order for Senokot-S.  If you still have difficulty with moving her bowels after the next 3 days please let us know    If you have any additional questions or concerns, please call the office ([phone]) and speak to one of the staff. They will triage and forward the message to the doctors! Have a great rest of your day!

## 2025-02-28 ENCOUNTER — HOSPITAL ENCOUNTER (OUTPATIENT)
Age: 75
Setting detail: SPECIMEN
Discharge: HOME | End: 2025-02-28

## 2025-02-28 DIAGNOSIS — R94.6 ABNORMAL THYROID UPTAKE: ICD-10-CM

## 2025-02-28 LAB
ANION GAP SERPL CALCULATED.3IONS-SCNC: 13 MMOL/L (ref 9–16)
BUN SERPL-MCNC: 13 MG/DL (ref 8–23)
CALCIUM SERPL-MCNC: 9.4 MG/DL (ref 8.6–10.4)
CHLORIDE SERPL-SCNC: 102 MMOL/L (ref 98–107)
CO2 SERPL-SCNC: 26 MMOL/L (ref 20–31)
CREAT SERPL-MCNC: 0.8 MG/DL (ref 0.6–0.9)
GFR, ESTIMATED: 77 ML/MIN/1.73M2
GLUCOSE SERPL-MCNC: 111 MG/DL (ref 74–99)
POTASSIUM SERPL-SCNC: 3.8 MMOL/L (ref 3.7–5.3)
SODIUM SERPL-SCNC: 141 MMOL/L (ref 136–145)
TSH SERPL DL<=0.05 MIU/L-ACNC: 0.63 UIU/ML (ref 0.27–4.2)

## 2025-03-03 ENCOUNTER — TELEPHONE (OUTPATIENT)
Dept: ONCOLOGY | Age: 75
End: 2025-03-03

## 2025-03-03 ENCOUNTER — HOSPITAL ENCOUNTER (OUTPATIENT)
Age: 75
Discharge: HOME | End: 2025-03-03
Payer: MEDICARE

## 2025-03-03 ENCOUNTER — INITIAL CONSULT (OUTPATIENT)
Dept: ONCOLOGY | Age: 75
End: 2025-03-03
Payer: MEDICARE

## 2025-03-03 VITALS
WEIGHT: 239 LBS | HEART RATE: 78 BPM | SYSTOLIC BLOOD PRESSURE: 126 MMHG | DIASTOLIC BLOOD PRESSURE: 84 MMHG | RESPIRATION RATE: 18 BRPM | BODY MASS INDEX: 39.77 KG/M2 | OXYGEN SATURATION: 97 % | TEMPERATURE: 98.6 F

## 2025-03-03 DIAGNOSIS — G20.A1 IDIOPATHIC PARKINSON'S DISEASE (HCC): ICD-10-CM

## 2025-03-03 DIAGNOSIS — R94.8 ABNORMAL GASTROINTESTINAL PET SCAN: ICD-10-CM

## 2025-03-03 DIAGNOSIS — R91.8 RIGHT LOWER LOBE LUNG MASS: ICD-10-CM

## 2025-03-03 DIAGNOSIS — E11.9 TYPE 2 DIABETES MELLITUS WITHOUT COMPLICATION, WITHOUT LONG-TERM CURRENT USE OF INSULIN (HCC): ICD-10-CM

## 2025-03-03 DIAGNOSIS — D3A.00: Primary | ICD-10-CM

## 2025-03-03 PROCEDURE — 36415 COLL VENOUS BLD VENIPUNCTURE: CPT

## 2025-03-03 PROCEDURE — 86316 IMMUNOASSAY TUMOR OTHER: CPT

## 2025-03-03 PROCEDURE — G8427 DOCREV CUR MEDS BY ELIG CLIN: HCPCS | Performed by: INTERNAL MEDICINE

## 2025-03-03 PROCEDURE — G8417 CALC BMI ABV UP PARAM F/U: HCPCS | Performed by: INTERNAL MEDICINE

## 2025-03-03 PROCEDURE — 1090F PRES/ABSN URINE INCON ASSESS: CPT | Performed by: INTERNAL MEDICINE

## 2025-03-03 PROCEDURE — 99205 OFFICE O/P NEW HI 60 MIN: CPT | Performed by: INTERNAL MEDICINE

## 2025-03-03 NOTE — PROGRESS NOTES
_           Ms. Sherley Reed is a very pleasant 74 y.o. female with history of multiple co morbidities as listed.  Patient is referred for evaluation further management of carcinoid tumor.  Patient has history of sarcoidosis and she follows up with pulmonary for 7 years.  She also had diagnosis of Parkinson.  Currently on treatment.  Follows up with neurology.  Patient presented with abdominal pain for 6 months.  She had CT scan of the abdomen which showed suspicious abnormality in the right hilum.  Further workup with scan for the chest and pulmonary evaluation was done. She was initially seen by pulmonologist  Dr. 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  She was evaluated by interventional pulmonology in [facility] which she had some debulking done of bronchus intermedius endobronchial carcinoid.  She was referred by Dr. Trujillo to [facility] for further evaluation  At the [facility], she was evaluated by an interventional pulmonologist via a virtual visit.  She had a PET scan done on 01/29/2025 and according to interventional pulmonology note from [facility], the majority of her tumor was found to be outside the airway, and a repeat CT scan confirmed that the airway was patent. Consequently, therapeutic bronchoscopy was not deemed necessary at that time by interventional pulmonology.    A PET scan on 01/20/2025 shows a large right infra hilar medial mass which was also seen in the CT scan with necrosis in the center but the bronchus intermedius endobronchial mass which was seen previously was not seen and likely decreased by debulking which was done in August by interventional pulmonology in [facility].  PET scan also shows uptake in thyroid and they have

## 2025-03-03 NOTE — TELEPHONE ENCOUNTER
Instructions   from Dr. Cami Gomez MD    Chromogranin A test today  RV about 4 months with repeated test before RV        Labs today  RV 7/7/25 at 2 pm with labs to be done a week before.

## 2025-03-04 ENCOUNTER — HOSPITAL ENCOUNTER (OUTPATIENT)
Dept: ULTRASOUND IMAGING | Age: 75
Discharge: HOME | End: 2025-03-06
Payer: MEDICARE

## 2025-03-04 DIAGNOSIS — R94.6 ABNORMAL THYROID UPTAKE: ICD-10-CM

## 2025-03-04 PROCEDURE — 76536 US EXAM OF HEAD AND NECK: CPT

## 2025-03-05 RX ORDER — DULAGLUTIDE 0.75 MG/.5ML
INJECTION, SOLUTION SUBCUTANEOUS
Qty: 2 ML | Refills: 1 | Status: SHIPPED | OUTPATIENT
Start: 2025-03-05

## 2025-03-06 LAB — CHROMOGRANIN A: 1340 NG/ML (ref 0–187)

## 2025-03-13 ENCOUNTER — RESULTS FOLLOW-UP (OUTPATIENT)
Dept: ULTRASOUND IMAGING | Age: 75
End: 2025-03-13

## 2025-03-18 ENCOUNTER — TELEPHONE (OUTPATIENT)
Dept: INFUSION THERAPY | Age: 75
End: 2025-03-18

## 2025-03-18 ENCOUNTER — TELEPHONE (OUTPATIENT)
Dept: INTERVENTIONAL RADIOLOGY/VASCULAR | Age: 75
End: 2025-03-18

## 2025-03-18 DIAGNOSIS — D3A.00: ICD-10-CM

## 2025-03-18 DIAGNOSIS — R91.8 RIGHT LOWER LOBE LUNG MASS: Primary | ICD-10-CM

## 2025-03-18 NOTE — TELEPHONE ENCOUNTER
Labs reviewed Dr. Draper said Chromogranin A is elevated consistent with underlying cause of carcinoid tumor.  Repeat next visit.  Patient updated.

## 2025-03-24 ENCOUNTER — OFFICE VISIT (OUTPATIENT)
Dept: GASTROENTEROLOGY | Age: 75
End: 2025-03-24
Payer: MEDICARE

## 2025-03-24 ENCOUNTER — TELEPHONE (OUTPATIENT)
Dept: GASTROENTEROLOGY | Age: 75
End: 2025-03-24

## 2025-03-24 VITALS
WEIGHT: 234 LBS | BODY MASS INDEX: 38.99 KG/M2 | SYSTOLIC BLOOD PRESSURE: 140 MMHG | HEIGHT: 65 IN | HEART RATE: 73 BPM | DIASTOLIC BLOOD PRESSURE: 67 MMHG

## 2025-03-24 DIAGNOSIS — R94.8 ABNORMAL GASTROINTESTINAL PET SCAN: Primary | ICD-10-CM

## 2025-03-24 PROCEDURE — 99204 OFFICE O/P NEW MOD 45 MIN: CPT | Performed by: INTERNAL MEDICINE

## 2025-03-24 PROCEDURE — 1090F PRES/ABSN URINE INCON ASSESS: CPT | Performed by: INTERNAL MEDICINE

## 2025-03-24 PROCEDURE — G8417 CALC BMI ABV UP PARAM F/U: HCPCS | Performed by: INTERNAL MEDICINE

## 2025-03-24 PROCEDURE — G8427 DOCREV CUR MEDS BY ELIG CLIN: HCPCS | Performed by: INTERNAL MEDICINE

## 2025-03-24 NOTE — PROGRESS NOTES
Reason for Referral:   Sid Devlin MD  [address]  [address]  [place] [address]    No chief complaint on file.          HISTORY OF PRESENT ILLNESS: Ms.Margie BILYL Reed is a 74 y.o. female with a pasthistory remarkable for carcinoid, referred for evaluation of abnormal PET-CT-scan.    She is known c/o- carcinoid tumor of lung, diagnosed in July 2024. Also has h/o- sarcoidosis. She was initially referred her to [facility] and was later seen at [facility] where she had debulking of tumor by interventional pulmonology.  She had a PET scan done on 01/29/2025 and according to interventional pulmonology note from [facility], the majority of her tumor was found to be outside the airway. PET scan also shows uptake in thyroid and they have recommended thyroid ultrasound. PET scan also shows uptake in gastric wall and concern for neuroendocrine involvement   She does c/o- abdominal bloating which improves when she takes Simethicone.    No c/o- nausea, vomiting, fever, loss of appetite, weight loss, bleeding ND, diarrhea, nocturnal diarrhea, tenesmus      Past Medical,Family, and Social History reviewed and does not contribute to the patient presentingcondition.     Alex cool's PMH/PSH,SH,PSYCH Hx, MEDs, ALLERGIES, and ROS were all reviewed and updated in the appropriate sections.    PAST MEDICAL HISTORY:  Past Medical History:   Diagnosis Date    Achilles tendinitis 07/16/2019    Acid reflux     Adrenal hyperplasia 07/16/2019    Age-related cataract 07/16/2019    Allergic rhinitis 07/16/2019    Arrhythmia     Asthma     Mild persistent asthma without complication    Bigeminy 07/17/2019    Bradycardia     Bursitis 07/16/2019    Cataract     Cellulitis of lip 07/16/2019    Chronic cough     Closed fracture of right humerus 2023    Depression     Diabetes mellitus (HCC) 07/16/2019    Dyspnea 07/16/2019    Epileptic seizure (HCC) 07/16/2019    GERD (gastroesophageal reflux disease)     Hemoptysis     High

## 2025-03-24 NOTE — TELEPHONE ENCOUNTER
TBS for EUS/ Nicole.  Dx: Abnormal gastrointestinal PET scan.    Patient is going to talk with Dr. Gomez about getting the EUS done. Will call back and schedule if she decides to do so.

## 2025-04-02 ENCOUNTER — OFFICE VISIT (OUTPATIENT)
Age: 75
End: 2025-04-02
Payer: MEDICARE

## 2025-04-02 VITALS
WEIGHT: 236 LBS | RESPIRATION RATE: 18 BRPM | TEMPERATURE: 97.9 F | HEART RATE: 67 BPM | DIASTOLIC BLOOD PRESSURE: 46 MMHG | BODY MASS INDEX: 39.27 KG/M2 | SYSTOLIC BLOOD PRESSURE: 135 MMHG

## 2025-04-02 DIAGNOSIS — Z78.9 NONSMOKER: ICD-10-CM

## 2025-04-02 DIAGNOSIS — I50.22 HYPERTENSIVE HEART DISEASE WITH CHRONIC SYSTOLIC CONGESTIVE HEART FAILURE (HCC): ICD-10-CM

## 2025-04-02 DIAGNOSIS — D3A.090 BRONCHIAL CARCINOID TUMORS: Primary | ICD-10-CM

## 2025-04-02 DIAGNOSIS — I11.0 HYPERTENSIVE HEART DISEASE WITH CHRONIC SYSTOLIC CONGESTIVE HEART FAILURE (HCC): ICD-10-CM

## 2025-04-02 PROCEDURE — 3017F COLORECTAL CA SCREEN DOC REV: CPT | Performed by: FAMILY MEDICINE

## 2025-04-02 PROCEDURE — 1090F PRES/ABSN URINE INCON ASSESS: CPT | Performed by: FAMILY MEDICINE

## 2025-04-02 PROCEDURE — 1159F MED LIST DOCD IN RCRD: CPT | Performed by: FAMILY MEDICINE

## 2025-04-02 PROCEDURE — 1036F TOBACCO NON-USER: CPT | Performed by: FAMILY MEDICINE

## 2025-04-02 PROCEDURE — 99212 OFFICE O/P EST SF 10 MIN: CPT

## 2025-04-02 PROCEDURE — G8417 CALC BMI ABV UP PARAM F/U: HCPCS | Performed by: FAMILY MEDICINE

## 2025-04-02 PROCEDURE — G8427 DOCREV CUR MEDS BY ELIG CLIN: HCPCS | Performed by: FAMILY MEDICINE

## 2025-04-02 PROCEDURE — G8400 PT W/DXA NO RESULTS DOC: HCPCS | Performed by: FAMILY MEDICINE

## 2025-04-02 PROCEDURE — 99213 OFFICE O/P EST LOW 20 MIN: CPT | Performed by: FAMILY MEDICINE

## 2025-04-02 PROCEDURE — 1123F ACP DISCUSS/DSCN MKR DOCD: CPT | Performed by: FAMILY MEDICINE

## 2025-04-02 RX ORDER — EZETIMIBE 10 MG/1
10 TABLET ORAL DAILY
Qty: 90 TABLET | Refills: 1 | Status: SHIPPED | OUTPATIENT
Start: 2025-04-02

## 2025-04-02 RX ORDER — DILTIAZEM HYDROCHLORIDE 180 MG/1
180 CAPSULE, EXTENDED RELEASE ORAL DAILY
Qty: 90 CAPSULE | Refills: 1 | Status: SHIPPED | OUTPATIENT
Start: 2025-04-02

## 2025-04-02 NOTE — PATIENT INSTRUCTIONS
Thank you for following up with us at [facility] outpatient residency clinic! It was a pleasure to meet you today!     Today we discussed the below as next steps in your care:  You have been having issues with appetite and therefore are not getting enough protein as you cannot seem to stomach being able to tolerate meat.  We discussed getting supplements to help with this.  See attached nutritional information on foods that contain protein.  You can also look at the below information to learn more about helping you to keep a balanced diet  We discussed you planning to get your thyroid biopsy tomorrow  Your diltiazem and Zetia were refilled    If you have any additional questions or concerns, please call the office ([phone]) and speak to one of the staff. They will triage and forward the message to the doctors! Have a great rest of your day!                When looking at the vegetable diagram tried to eat 1 vegetable from each color in a meal      Based off of the tables that talked about what foods you can eat more of what foods you can eat some of and what foods you can eat less of look at the below diagram.  Each column will help you decide how much of each of the 3 categories (eat more, eat some or eat less) you should eat.        Use your own hands to figure out what your portion sizes of proteins, vegetables, carbs and fats should be        The below diagram is based off of both what your goals are as well as what you are level of activity is.  First pick your level of activity which is the row in light blue across the top, then pick your goal which is in the first column down the left.  You can then see how to build your plate.        Resources:   https://www.precisionnutrition.com/what-should-i-eat-infographic  https://www.precisionnutrition.com/calorie-control-guide-infographic

## 2025-04-02 NOTE — PROGRESS NOTES
nightmares    Gabapentin Other (See Comments)    Jardiance [Empagliflozin] Itching    Lamotrigine      Severe abd pain    Oxycodone-Acetaminophen      Other Reaction(s): Not available    Propranolol      Severe muscle cramping    Pyridium [Phenazopyridine Hcl]      Unable to void    Wheat Other (See Comments)    Zyrtec [Cetirizine]      Loss of strength in legs    Keppra [Levetiracetam] Rash     Rash and itching    Percocet [Oxycodone-Acetaminophen] Rash     Rash and itching    Rofecoxib Rash and Other (See Comments)     Rash and itching    Topiramate Rash       Patient Active Problem List   Diagnosis    Age-related cataract    Achilles tendinitis    Adrenal hyperplasia    Allergic rhinitis    Bradycardia    Bursitis    Diabetes mellitus (HCC)    Epileptic seizure (HCC)    Menopause present    Numbness of hand    Sarcoidosis    Seizure (HCC)    Vulvar dystrophy    Dyspnea    Bigeminy    JANIE (obstructive sleep apnea)    Idiopathic Parkinson's disease ([facility])    Mild intermittent asthma, uncomplicated    Chronic constipation    Right lower lobe lung mass    Hypertensive heart disease with chronic systolic congestive heart failure ([facility])    Nonsmoker    Bronchial carcinoid tumors    Abnormal gastrointestinal PET scan    Abnormal thyroid uptake    Carcinoid tumor (except of appendix) ([facility])       Past Medical History:   Diagnosis Date    Achilles tendinitis 07/16/2019    Acid reflux     Adrenal hyperplasia 07/16/2019    Age-related cataract 07/16/2019    Allergic rhinitis 07/16/2019    Arrhythmia     Asthma     Mild persistent asthma without complication    Bigeminy 07/17/2019    Bradycardia     Bursitis 07/16/2019    Cataract     Cellulitis of lip 07/16/2019    Chronic cough     Closed fracture of right humerus 2023    Depression     Diabetes mellitus (HCC) 07/16/2019    Dyspnea 07/16/2019    Epileptic seizure (HCC) 07/16/2019    GERD (gastroesophageal reflux disease)     Hemoptysis     High cholesterol     Hypertension

## 2025-04-03 ENCOUNTER — HOSPITAL ENCOUNTER (OUTPATIENT)
Dept: ULTRASOUND IMAGING | Age: 75
Discharge: HOME | End: 2025-04-05
Payer: MEDICARE

## 2025-04-03 DIAGNOSIS — E04.2 MULTIPLE THYROID NODULES: ICD-10-CM

## 2025-04-03 PROCEDURE — 10005 FNA BX W/US GDN 1ST LES: CPT

## 2025-04-07 LAB — SURGICAL PATHOLOGY REPORT: NORMAL

## 2025-04-08 ENCOUNTER — TELEPHONE (OUTPATIENT)
Age: 75
End: 2025-04-08

## 2025-04-09 ENCOUNTER — OFFICE VISIT (OUTPATIENT)
Dept: PULMONOLOGY | Age: 75
End: 2025-04-09
Payer: MEDICARE

## 2025-04-09 VITALS
BODY MASS INDEX: 38.92 KG/M2 | DIASTOLIC BLOOD PRESSURE: 71 MMHG | RESPIRATION RATE: 19 BRPM | OXYGEN SATURATION: 96 % | SYSTOLIC BLOOD PRESSURE: 133 MMHG | HEART RATE: 65 BPM | HEIGHT: 65 IN | WEIGHT: 233.6 LBS

## 2025-04-09 DIAGNOSIS — R05.3 CHRONIC COUGH: ICD-10-CM

## 2025-04-09 DIAGNOSIS — D3A.090 BRONCHIAL CARCINOID TUMORS: ICD-10-CM

## 2025-04-09 DIAGNOSIS — C7A.8 NEUROENDOCRINE CARCINOMA OF LUNG: Primary | ICD-10-CM

## 2025-04-09 DIAGNOSIS — E66.812 CLASS 2 SEVERE OBESITY DUE TO EXCESS CALORIES WITH SERIOUS COMORBIDITY AND BODY MASS INDEX (BMI) OF 39.0 TO 39.9 IN ADULT: ICD-10-CM

## 2025-04-09 DIAGNOSIS — J45.41 MODERATE PERSISTENT ASTHMA WITH EXACERBATION: ICD-10-CM

## 2025-04-09 DIAGNOSIS — G47.33 OSA ON CPAP: ICD-10-CM

## 2025-04-09 DIAGNOSIS — E66.01 CLASS 2 SEVERE OBESITY DUE TO EXCESS CALORIES WITH SERIOUS COMORBIDITY AND BODY MASS INDEX (BMI) OF 39.0 TO 39.9 IN ADULT: ICD-10-CM

## 2025-04-09 PROCEDURE — G8427 DOCREV CUR MEDS BY ELIG CLIN: HCPCS | Performed by: INTERNAL MEDICINE

## 2025-04-09 PROCEDURE — G8417 CALC BMI ABV UP PARAM F/U: HCPCS | Performed by: INTERNAL MEDICINE

## 2025-04-09 PROCEDURE — 1123F ACP DISCUSS/DSCN MKR DOCD: CPT | Performed by: INTERNAL MEDICINE

## 2025-04-09 PROCEDURE — 3017F COLORECTAL CA SCREEN DOC REV: CPT | Performed by: INTERNAL MEDICINE

## 2025-04-09 PROCEDURE — 1159F MED LIST DOCD IN RCRD: CPT | Performed by: INTERNAL MEDICINE

## 2025-04-09 PROCEDURE — 99214 OFFICE O/P EST MOD 30 MIN: CPT | Performed by: INTERNAL MEDICINE

## 2025-04-09 PROCEDURE — G8400 PT W/DXA NO RESULTS DOC: HCPCS | Performed by: INTERNAL MEDICINE

## 2025-04-09 PROCEDURE — 1036F TOBACCO NON-USER: CPT | Performed by: INTERNAL MEDICINE

## 2025-04-09 PROCEDURE — 1090F PRES/ABSN URINE INCON ASSESS: CPT | Performed by: INTERNAL MEDICINE

## 2025-04-09 NOTE — PROGRESS NOTES
OUTPATIENT PULMONARY PROGRESS NOTE      Patient:  Sherley Reed  MRN: [id number]      Primacy Care Physician: Leti Cid MD    HISTORY OF PRESENT ILLNESS:       History of Present Illness    The patient is a 74-year-old female who presents for follow-up of neuroendocrine carcinoid of the lung, bronchial carcinoid tumor, obstructive sleep apnea on CPAP, moderate persistent asthma, history of chronic cough, and obesity.    When she was seen last time after she was evaluated by interventional pulmonology in [facility] and no intervention was planned.  She was referred to oncology.  She was evaluated by Dr. You and she was told that she has stage IIa neuroendocrine cancer, who informed her that the diseasewith chemotherapy is indicated and the treatment option would be lung resection/surgery. Her chromogranin A level was recorded at 1340.she will be followed up with oncology with repeat chromogranin a level in June.  Despite the diagnosis, she reports no respiratory symptoms and maintains good respiratory function. She has a long-standing history of coughing, which has not worsened.    She had a consultation with an interventional pulmonologist in [facility] The intervention pulmonologist confirmed that the area is not obstructed on repeat CT scan and PET scan and did not recommend any further intervention at that. She has a scheduled appointment with Dr. You in 06/2025 and another blood test. Her last PET scan and CT scan were conducted in January 2025.    She reports satisfactory breathing and the ability to perform regular activities without experiencing shortness of breath. She has lost 30 pounds since her diagnosis, which she attributes to being overweight. She reports poor appetite and lack of hunger, and she does not consume meat. She is mindful of her protein intake, incorporating cottage cheese and yogurt into her diet.     She uses Xopenex nightly but does

## 2025-04-11 ENCOUNTER — TELEPHONE (OUTPATIENT)
Age: 75
End: 2025-04-11

## 2025-04-11 NOTE — TELEPHONE ENCOUNTER
The patient and I had a discussion regarding her finding of benign follicular thyroid nodule and whether she should keep on taking her Trulicity. 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 It is not actually contraindicated in this case. Discussed with Dr Wyatt and Dr Liz Cartwright who agree. Attempted to call the patient to let her know and say its her choice whether she wants to continue with Trulicty or switch to something else and I could not reach her. I kept getting a weird response on her phone, not an answering machine.    Will try to call again on Monday.    Leti Cid, PGY-2   Family Medicine  4/11/2025  3:55 PM

## 2025-04-16 RX ORDER — OMEPRAZOLE 40 MG/1
40 CAPSULE, DELAYED RELEASE ORAL DAILY
Qty: 90 CAPSULE | Refills: 2 | Status: SHIPPED | OUTPATIENT
Start: 2025-04-16

## 2025-05-15 DIAGNOSIS — J45.909 MILD ASTHMA, UNSPECIFIED WHETHER COMPLICATED, UNSPECIFIED WHETHER PERSISTENT: ICD-10-CM

## 2025-05-16 RX ORDER — LEVALBUTEROL INHALATION SOLUTION 0.63 MG/3ML
SOLUTION RESPIRATORY (INHALATION)
Qty: 120 ML | Refills: 10 | Status: SHIPPED | OUTPATIENT
Start: 2025-05-16

## 2025-05-16 NOTE — TELEPHONE ENCOUNTER
LAST VISIT: 4/9/25  NEXT VISIT: 8/6/25    Per last dictation patient is on this medication. Please sign for refill if ok. Thank you.

## 2025-05-19 ENCOUNTER — OFFICE VISIT (OUTPATIENT)
Age: 75
End: 2025-05-19
Payer: MEDICARE

## 2025-05-19 VITALS
WEIGHT: 235 LBS | SYSTOLIC BLOOD PRESSURE: 129 MMHG | HEIGHT: 65 IN | HEART RATE: 71 BPM | TEMPERATURE: 97.7 F | BODY MASS INDEX: 39.15 KG/M2 | DIASTOLIC BLOOD PRESSURE: 62 MMHG | RESPIRATION RATE: 14 BRPM

## 2025-05-19 DIAGNOSIS — Z00.00 MEDICARE ANNUAL WELLNESS VISIT, SUBSEQUENT: Primary | ICD-10-CM

## 2025-05-19 PROCEDURE — G0439 PPPS, SUBSEQ VISIT: HCPCS | Performed by: FAMILY MEDICINE

## 2025-05-19 PROCEDURE — 1159F MED LIST DOCD IN RCRD: CPT | Performed by: FAMILY MEDICINE

## 2025-05-19 PROCEDURE — 1125F AMNT PAIN NOTED PAIN PRSNT: CPT | Performed by: FAMILY MEDICINE

## 2025-05-19 PROCEDURE — 3017F COLORECTAL CA SCREEN DOC REV: CPT | Performed by: FAMILY MEDICINE

## 2025-05-19 PROCEDURE — 1123F ACP DISCUSS/DSCN MKR DOCD: CPT | Performed by: FAMILY MEDICINE

## 2025-05-19 RX ORDER — HYDROCHLOROTHIAZIDE 25 MG/1
25 TABLET ORAL DAILY
Qty: 90 TABLET | Refills: 2 | Status: SHIPPED | OUTPATIENT
Start: 2025-05-19

## 2025-05-19 SDOH — ECONOMIC STABILITY: FOOD INSECURITY: WITHIN THE PAST 12 MONTHS, THE FOOD YOU BOUGHT JUST DIDN'T LAST AND YOU DIDN'T HAVE MONEY TO GET MORE.: NEVER TRUE

## 2025-05-19 SDOH — ECONOMIC STABILITY: HOUSING INSECURITY: IN THE LAST 12 MONTHS, WAS THERE A TIME WHEN YOU WERE NOT ABLE TO PAY THE MORTGAGE OR RENT ON TIME?: NO

## 2025-05-19 SDOH — HEALTH STABILITY: PHYSICAL HEALTH: ON AVERAGE, HOW MANY DAYS PER WEEK DO YOU ENGAGE IN MODERATE TO STRENUOUS EXERCISE (LIKE A BRISK WALK)?: 1 DAY

## 2025-05-19 SDOH — ECONOMIC STABILITY: TRANSPORTATION INSECURITY: IN THE PAST 12 MONTHS, HAS LACK OF TRANSPORTATION KEPT YOU FROM MEDICAL APPOINTMENTS OR FROM GETTING MEDICATIONS?: NO

## 2025-05-19 SDOH — HEALTH STABILITY: PHYSICAL HEALTH: ON AVERAGE, HOW MANY MINUTES DO YOU ENGAGE IN EXERCISE AT THIS LEVEL?: 10 MIN

## 2025-05-19 SDOH — ECONOMIC STABILITY: FOOD INSECURITY: WITHIN THE PAST 12 MONTHS, YOU WORRIED THAT YOUR FOOD WOULD RUN OUT BEFORE YOU GOT THE MONEY TO BUY MORE.: NEVER TRUE

## 2025-05-19 SDOH — ECONOMIC STABILITY: FOOD INSECURITY: HOW HARD IS IT FOR YOU TO PAY FOR THE VERY BASICS LIKE FOOD, HOUSING, MEDICAL CARE, AND HEATING?: NOT HARD AT ALL

## 2025-05-19 ASSESSMENT — LIFESTYLE VARIABLES
HOW OFTEN DO YOU HAVE A DRINK CONTAINING ALCOHOL: 1
HOW OFTEN DO YOU HAVE A DRINK CONTAINING ALCOHOL: NEVER
HOW MANY STANDARD DRINKS CONTAINING ALCOHOL DO YOU HAVE ON A TYPICAL DAY: PATIENT DOES NOT DRINK
HOW MANY STANDARD DRINKS CONTAINING ALCOHOL DO YOU HAVE ON A TYPICAL DAY: 0
HOW OFTEN DO YOU HAVE SIX OR MORE DRINKS ON ONE OCCASION: 1

## 2025-05-19 ASSESSMENT — PATIENT HEALTH QUESTIONNAIRE - PHQ9
2. FEELING DOWN, DEPRESSED OR HOPELESS: SEVERAL DAYS
SUM OF ALL RESPONSES TO PHQ QUESTIONS 1-9: 2
1. LITTLE INTEREST OR PLEASURE IN DOING THINGS: SEVERAL DAYS
SUM OF ALL RESPONSES TO PHQ QUESTIONS 1-9: 2

## 2025-05-19 ASSESSMENT — ACTIVITIES OF DAILY LIVING (ADL): LACK_OF_TRANSPORTATION: NO

## 2025-05-19 NOTE — PATIENT INSTRUCTIONS
Thank you for coming in today and allowing me to be part of your care team.    Consider following up with your gyn oncologist for further PAP testing.   I'll have Dr. Cid send a referral for [place] our psychologist.   Please bring a copy of your advanced directives with you next time you come in.     If you have any questions or concerns, please always feel free to call the office at [phone] and ask for Liz the Pharmacist.        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 Cid order this for you, but I will look to see the PET scan results.    Colorectal Cancer Screening FIT test yearly, Cologuard every 3 years, Colonoscopy every 10 years *unless otherwise indicated* It looks like you had a colonoscopy in 2015  We will call [facility] to get the report.    Cardiovascular/cholesterol screening As determined by your physician 6/11/24  Total cholesterol: 176  Triglycerides: 232  HDL (good cholesterol): 56  LDL (bad cholesterol): 74 As determined by your physician   Diabetes screening   As determined by your physician

## 2025-05-19 NOTE — PROGRESS NOTES
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 Cid order this for you, but I will look to see the PET scan results.    Colorectal Cancer Screening FIT test yearly, Cologuard every 3 years, Colonoscopy every 10 years *unless otherwise indicated* It looks like you had a colonoscopy in 2015  We will call [facility] to get the report.    Cardiovascular/cholesterol screening As determined by your physician 6/11/24  Total cholesterol: 176  Triglycerides: 232  HDL (good cholesterol): 56  LDL (bad cholesterol): 74 As determined by your physician   Diabetes screening   As determined by your physician 12/4/2024  A1c/glucose: 6.8% I will have Dr. Cid order this for you.    Osteoporosis screening   DEXA every 2 years for females (ages 65-85) We have no record of a DEXA for you Keep it on your radar and we can order it if you would like.    Screening for abdominal aortic aneurysm One-time screening in patients if you have a family history of abdominal aortic aneurysms, or you're a man 65-75 and have smoked at least 100 cigarettes in your

## 2025-05-20 ENCOUNTER — TELEPHONE (OUTPATIENT)
Age: 75
End: 2025-05-20

## 2025-05-20 NOTE — TELEPHONE ENCOUNTER
Derek Cid!  I met with Sherley for a MAW.  Please see note for full details and note the following action items:    1)  She would like a referral to [place].  She has a number of health issues going on and she would just like to talk to someone for counseling.  Please send.    2)  I had clinical staff call for her eye exam and colonoscopy so be on the lookout for those for metrics.     3)  She had a PET scan done this year and I am wondering if that meets the metric for a breast cancer screening as she is overdue for a mammogram.    4)  She will bring in advanced directives the next time she comes.    5)  She has had a abnormal Pap in the past.  And noted that she was supposed to follow with a gynecologist she just oncologist but has not done so recently.  Please follow-up with her on this.    6) her last A1c was in December, so she is due at this time and needs to have other blood work done on 6/25/2025 so please order to be done with that.

## 2025-05-22 DIAGNOSIS — D86.9 SARCOIDOSIS: ICD-10-CM

## 2025-05-22 DIAGNOSIS — G20.A1 IDIOPATHIC PARKINSON'S DISEASE (HCC): Primary | ICD-10-CM

## 2025-05-22 DIAGNOSIS — D3A.00: ICD-10-CM

## 2025-05-22 DIAGNOSIS — E66.812 CLASS 2 OBESITY DUE TO EXCESS CALORIES WITH BODY MASS INDEX (BMI) OF 39.0 TO 39.9 IN ADULT, UNSPECIFIED WHETHER SERIOUS COMORBIDITY PRESENT: ICD-10-CM

## 2025-05-22 DIAGNOSIS — E66.09 CLASS 2 OBESITY DUE TO EXCESS CALORIES WITH BODY MASS INDEX (BMI) OF 39.0 TO 39.9 IN ADULT, UNSPECIFIED WHETHER SERIOUS COMORBIDITY PRESENT: ICD-10-CM

## 2025-05-22 DIAGNOSIS — G40.909 EPILEPTIC SEIZURE (HCC): ICD-10-CM

## 2025-05-26 DIAGNOSIS — E11.9 TYPE 2 DIABETES MELLITUS WITHOUT COMPLICATION, WITHOUT LONG-TERM CURRENT USE OF INSULIN (HCC): ICD-10-CM

## 2025-06-06 ENCOUNTER — PREP FOR PROCEDURE (OUTPATIENT)
Dept: GASTROENTEROLOGY | Age: 75
End: 2025-06-06

## 2025-06-06 NOTE — TELEPHONE ENCOUNTER
As discussed, I'm going to close out this encounter and if you feel like anything of her testing qualifies, then please send a task to front office.  Thanks!

## 2025-06-25 ENCOUNTER — HOSPITAL ENCOUNTER (OUTPATIENT)
Age: 75
Setting detail: SPECIMEN
Discharge: HOME | End: 2025-06-25

## 2025-06-25 DIAGNOSIS — E11.9 TYPE 2 DIABETES MELLITUS WITHOUT COMPLICATION, WITHOUT LONG-TERM CURRENT USE OF INSULIN (HCC): ICD-10-CM

## 2025-06-25 DIAGNOSIS — R91.8 RIGHT LOWER LOBE LUNG MASS: ICD-10-CM

## 2025-06-25 DIAGNOSIS — D3A.00: ICD-10-CM

## 2025-06-25 LAB
CHOLEST SERPL-MCNC: 182 MG/DL (ref 0–199)
CHOLESTEROL/HDL RATIO: 3
HDLC SERPL-MCNC: 61 MG/DL
LDLC SERPL CALC-MCNC: 92 MG/DL (ref 0–100)
TRIGL SERPL-MCNC: 145 MG/DL
VLDLC SERPL CALC-MCNC: 29 MG/DL (ref 1–30)

## 2025-06-27 LAB — CHROMOGRANIN A: 1869 NG/ML (ref 0–187)

## 2025-07-02 ENCOUNTER — OFFICE VISIT (OUTPATIENT)
Age: 75
End: 2025-07-02
Payer: MEDICARE

## 2025-07-02 ENCOUNTER — HOSPITAL ENCOUNTER (OUTPATIENT)
Age: 75
Setting detail: SPECIMEN
Discharge: HOME | End: 2025-07-02

## 2025-07-02 VITALS
HEIGHT: 65 IN | TEMPERATURE: 98.4 F | WEIGHT: 240 LBS | HEART RATE: 67 BPM | SYSTOLIC BLOOD PRESSURE: 125 MMHG | BODY MASS INDEX: 39.99 KG/M2 | DIASTOLIC BLOOD PRESSURE: 51 MMHG | RESPIRATION RATE: 12 BRPM

## 2025-07-02 DIAGNOSIS — M25.552 BILATERAL HIP PAIN: ICD-10-CM

## 2025-07-02 DIAGNOSIS — E11.9 TYPE 2 DIABETES MELLITUS WITHOUT COMPLICATION, WITHOUT LONG-TERM CURRENT USE OF INSULIN (HCC): ICD-10-CM

## 2025-07-02 DIAGNOSIS — B02.30 HERPES ZOSTER WITH OPHTHALMIC COMPLICATION, UNSPECIFIED HERPES ZOSTER EYE DISEASE: Primary | ICD-10-CM

## 2025-07-02 DIAGNOSIS — M25.551 BILATERAL HIP PAIN: ICD-10-CM

## 2025-07-02 LAB
CREAT UR-MCNC: 73 MG/DL (ref 28–217)
MICROALBUMIN UR-MCNC: <12 MG/L (ref 0–20)
MICROALBUMIN/CREAT UR-RTO: NORMAL MCG/MG CREAT (ref 0–25)

## 2025-07-02 PROCEDURE — 3046F HEMOGLOBIN A1C LEVEL >9.0%: CPT | Performed by: FAMILY MEDICINE

## 2025-07-02 PROCEDURE — 1123F ACP DISCUSS/DSCN MKR DOCD: CPT

## 2025-07-02 PROCEDURE — G8400 PT W/DXA NO RESULTS DOC: HCPCS | Performed by: FAMILY MEDICINE

## 2025-07-02 PROCEDURE — 99213 OFFICE O/P EST LOW 20 MIN: CPT

## 2025-07-02 PROCEDURE — 1036F TOBACCO NON-USER: CPT | Performed by: FAMILY MEDICINE

## 2025-07-02 PROCEDURE — 1090F PRES/ABSN URINE INCON ASSESS: CPT | Performed by: FAMILY MEDICINE

## 2025-07-02 PROCEDURE — G8427 DOCREV CUR MEDS BY ELIG CLIN: HCPCS | Performed by: FAMILY MEDICINE

## 2025-07-02 PROCEDURE — 3017F COLORECTAL CA SCREEN DOC REV: CPT | Performed by: FAMILY MEDICINE

## 2025-07-02 PROCEDURE — 99212 OFFICE O/P EST SF 10 MIN: CPT

## 2025-07-02 PROCEDURE — G8417 CALC BMI ABV UP PARAM F/U: HCPCS | Performed by: FAMILY MEDICINE

## 2025-07-02 PROCEDURE — 2022F DILAT RTA XM EVC RTNOPTHY: CPT | Performed by: FAMILY MEDICINE

## 2025-07-02 PROCEDURE — 1125F AMNT PAIN NOTED PAIN PRSNT: CPT

## 2025-07-02 RX ORDER — VALACYCLOVIR HYDROCHLORIDE 1 G/1
1000 TABLET, FILM COATED ORAL 3 TIMES DAILY
Qty: 30 TABLET | Refills: 0 | Status: SHIPPED | OUTPATIENT
Start: 2025-07-02 | End: 2025-07-12

## 2025-07-02 ASSESSMENT — PATIENT HEALTH QUESTIONNAIRE - PHQ9
10. IF YOU CHECKED OFF ANY PROBLEMS, HOW DIFFICULT HAVE THESE PROBLEMS MADE IT FOR YOU TO DO YOUR WORK, TAKE CARE OF THINGS AT HOME, OR GET ALONG WITH OTHER PEOPLE: SOMEWHAT DIFFICULT
2. FEELING DOWN, DEPRESSED OR HOPELESS: SEVERAL DAYS
SUM OF ALL RESPONSES TO PHQ QUESTIONS 1-9: 3
7. TROUBLE CONCENTRATING ON THINGS, SUCH AS READING THE NEWSPAPER OR WATCHING TELEVISION: NOT AT ALL
4. FEELING TIRED OR HAVING LITTLE ENERGY: SEVERAL DAYS
6. FEELING BAD ABOUT YOURSELF - OR THAT YOU ARE A FAILURE OR HAVE LET YOURSELF OR YOUR FAMILY DOWN: NOT AT ALL
5. POOR APPETITE OR OVEREATING: NOT AT ALL
SUM OF ALL RESPONSES TO PHQ QUESTIONS 1-9: 3
8. MOVING OR SPEAKING SO SLOWLY THAT OTHER PEOPLE COULD HAVE NOTICED. OR THE OPPOSITE, BEING SO FIGETY OR RESTLESS THAT YOU HAVE BEEN MOVING AROUND A LOT MORE THAN USUAL: NOT AT ALL
SUM OF ALL RESPONSES TO PHQ QUESTIONS 1-9: 3
SUM OF ALL RESPONSES TO PHQ QUESTIONS 1-9: 3
1. LITTLE INTEREST OR PLEASURE IN DOING THINGS: SEVERAL DAYS
3. TROUBLE FALLING OR STAYING ASLEEP: NOT AT ALL
9. THOUGHTS THAT YOU WOULD BE BETTER OFF DEAD, OR OF HURTING YOURSELF: NOT AT ALL

## 2025-07-02 NOTE — PROGRESS NOTES
[facility]  [address] [place] [address]  Phone: [phone]  Fax: [phone]      Date of Visit: 2025   Patient Name: Sherley Reed   Patient :  1950     ASSESSMENT/PLAN   1. Herpes zoster with ophthalmic complication, unspecified herpes zoster eye disease  -     valACYclovir (VALTREX) 1 g tablet; Take 1 tablet by mouth 3 times daily for 10 days, Disp-30 tablet, R-0Normal  2. Bilateral hip pain  -     XR HIP BILATERAL W AP PELVIS (2 VIEWS); Future  -     [facility] Physical [facility]  3. Type 2 diabetes mellitus without complication, without long-term current use of insulin (HCC)  -     Albumin/Creatinine Ratio, Urine; Future  Sherley is a 74-year-old female with a past medical history of neuroendocrine carcinoid of the lung, bronchial carcinoid tumor that presents today with shingles and bilateral hip pain.  Patient takes Valtrex 500 mg daily for preventative.  This dose however is not the dose that is used for an active outbreak.  Placed an order for prescription of 1 g of Valtrex 3 times daily for 10 days.  Told the patient she can either  this prescription or she can take 2 tablets of her 500 mg tablets 3 times a day for 10 days.  Also highly recommended that she get an x-ray of her hips bilaterally to make sure that it is osteoarthritis that we are treating due to her past exposure to steroids.  There are concerns that there may be some thinning secondary to steroid exposure and if we give her steroids at this point it could make things worse.  Referral for physical therapy was also provided in case she would like to go that route.    See communications section to see instructions provided to patient  Return in about 4 weeks (around 2025) for Diabetes and hypertension.    Patient was discussed with Hema Desai MD.  Electronically signed by Leti Cid MD on 2025 at 5:24

## 2025-07-02 NOTE — PATIENT INSTRUCTIONS
Thank you for following up with us at [facility] outpatient residency clinic! It was a pleasure to meet you today!     Today we discussed the below as next steps in your care:  Today you noted that you are having shingles in the left side of your face.  The dose of Valtrex that you are taking is not high enough to treat an active case of shingles.  For this reason you can either 2 tablets 3 times a day from the prescription that you currently have or  the prescription that I have placed for a 1 g tablet that you would take 3 times a day.  Either way you will do this for 10 days.  You have also noted that you are having bilateral hip pain.  Based on your exam it seems like it is osteoarthritis.  The recommendation for this is to have an x-ray of your hips done and get physical therapy.  You are currently taking Tylenol which she notes has been helpful.  Just in case I have provided an order for an x-ray for you to have that done and a referral for you to get physical therapy to help with your hip pain.  You can decide whether or not you want to proceed with this but it is my recommendation at this time.    If you have any additional questions or concerns, please call the office ([phone]) and speak to one of the staff. They will triage and forward the message to the doctors! Have a great rest of your day!

## 2025-07-03 ENCOUNTER — RESULTS FOLLOW-UP (OUTPATIENT)
Age: 75
End: 2025-07-03

## 2025-07-07 ENCOUNTER — TELEPHONE (OUTPATIENT)
Age: 75
End: 2025-07-07

## 2025-07-07 ENCOUNTER — OFFICE VISIT (OUTPATIENT)
Age: 75
End: 2025-07-07
Payer: MEDICARE

## 2025-07-07 VITALS
RESPIRATION RATE: 16 BRPM | BODY MASS INDEX: 39.71 KG/M2 | DIASTOLIC BLOOD PRESSURE: 78 MMHG | WEIGHT: 238.6 LBS | SYSTOLIC BLOOD PRESSURE: 132 MMHG | OXYGEN SATURATION: 95 % | TEMPERATURE: 97.5 F | HEART RATE: 71 BPM

## 2025-07-07 DIAGNOSIS — D3A.090: Primary | ICD-10-CM

## 2025-07-07 DIAGNOSIS — D3A.00: Primary | ICD-10-CM

## 2025-07-07 PROCEDURE — 99213 OFFICE O/P EST LOW 20 MIN: CPT | Performed by: INTERNAL MEDICINE

## 2025-07-07 PROCEDURE — G8417 CALC BMI ABV UP PARAM F/U: HCPCS | Performed by: INTERNAL MEDICINE

## 2025-07-07 PROCEDURE — 1090F PRES/ABSN URINE INCON ASSESS: CPT | Performed by: INTERNAL MEDICINE

## 2025-07-07 PROCEDURE — 1036F TOBACCO NON-USER: CPT | Performed by: INTERNAL MEDICINE

## 2025-07-07 PROCEDURE — 99211 OFF/OP EST MAY X REQ PHY/QHP: CPT | Performed by: INTERNAL MEDICINE

## 2025-07-07 PROCEDURE — 99214 OFFICE O/P EST MOD 30 MIN: CPT | Performed by: INTERNAL MEDICINE

## 2025-07-07 PROCEDURE — 1159F MED LIST DOCD IN RCRD: CPT | Performed by: INTERNAL MEDICINE

## 2025-07-07 PROCEDURE — 3017F COLORECTAL CA SCREEN DOC REV: CPT | Performed by: INTERNAL MEDICINE

## 2025-07-07 PROCEDURE — 1123F ACP DISCUSS/DSCN MKR DOCD: CPT | Performed by: INTERNAL MEDICINE

## 2025-07-07 PROCEDURE — G8427 DOCREV CUR MEDS BY ELIG CLIN: HCPCS | Performed by: INTERNAL MEDICINE

## 2025-07-07 PROCEDURE — 1125F AMNT PAIN NOTED PAIN PRSNT: CPT | Performed by: INTERNAL MEDICINE

## 2025-07-07 PROCEDURE — G8400 PT W/DXA NO RESULTS DOC: HCPCS | Performed by: INTERNAL MEDICINE

## 2025-07-07 NOTE — PATIENT INSTRUCTIONS
RV about 4 months with repeated test before RV  Please provide patient with print out of octreotide treatment patient infornation

## 2025-07-07 NOTE — PROGRESS NOTES
_  Chief Complaint   Patient presents with    Follow-up     Review status of disease    Other     Pt states she's been having leg and hip pain for the past 3 months, is unable to sleep.      Discuss Labs    Treatment     PT needs to know if she should continue Trulicity.     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  Status post debulking  History of sarcoidosis  History of Parkinson disease  Multiple comorbidities as listed    CURRENT THERAPY:         Close monitoring for carcinoid  BRIEF CASE HISTORY:      Ms. Sherley Reed is a very pleasant 74 y.o. female with history of multiple co morbidities as listed.  Patient is referred for evaluation further management of carcinoid tumor.  Patient has history of sarcoidosis and she follows up with pulmonary for 7 years.  She also had diagnosis of Parkinson.  Currently on treatment.  Follows up with neurology.  Patient presented with abdominal pain for 6 months.  She had CT scan of the abdomen which showed suspicious abnormality in the right hilum.  Further workup with scan for the chest and pulmonary evaluation was done. She was initially seen by pulmonologist  Dr. Main Trujillo, who performed a bronchoscopy and referred her to the [facility] for further evaluation of her bronchial carcinoid tumor.  She was evaluated by interventional pulmonology in [facility] which she had some debulking done of bronchus intermedius endobronchial carcinoid.  She was referred by Dr. Trujillo to [facility] for further evaluation  At the [facility], she was evaluated by an interventional pulmonologist via a virtual visit.  She had a PET scan done on 01/29/2025 and according to interventional pulmonology note from [facility], the majority of her tumor was found to be outside the

## 2025-07-07 NOTE — TELEPHONE ENCOUNTER
RV about 4 months with repeated test before RV  Please provide patient with print out of octreotide treatment patient infornation      Printed medication information for patient. Patient is scheduled on 11/3 to follow up with Patricia. Patient will have labs done 1 week before. Gave patient lab order and AVS.

## 2025-07-09 ENCOUNTER — OFFICE VISIT (OUTPATIENT)
Age: 75
End: 2025-07-09
Payer: MEDICARE

## 2025-07-09 DIAGNOSIS — F32.1 CURRENT MODERATE EPISODE OF MAJOR DEPRESSIVE DISORDER, UNSPECIFIED WHETHER RECURRENT (HCC): Primary | ICD-10-CM

## 2025-07-09 PROCEDURE — 90791 PSYCH DIAGNOSTIC EVALUATION: CPT | Performed by: PSYCHOLOGIST

## 2025-07-09 PROCEDURE — 1123F ACP DISCUSS/DSCN MKR DOCD: CPT | Performed by: PSYCHOLOGIST

## 2025-07-09 PROCEDURE — 1036F TOBACCO NON-USER: CPT | Performed by: PSYCHOLOGIST

## 2025-07-24 ENCOUNTER — HOSPITAL ENCOUNTER (OUTPATIENT)
Dept: GENERAL RADIOLOGY | Age: 75
Discharge: HOME | End: 2025-07-26
Payer: MEDICARE

## 2025-07-24 ENCOUNTER — HOSPITAL ENCOUNTER (OUTPATIENT)
Dept: CT IMAGING | Age: 75
Discharge: HOME | End: 2025-07-26
Attending: INTERNAL MEDICINE
Payer: MEDICARE

## 2025-07-24 DIAGNOSIS — D3A.090: ICD-10-CM

## 2025-07-24 DIAGNOSIS — M25.552 BILATERAL HIP PAIN: ICD-10-CM

## 2025-07-24 DIAGNOSIS — M25.551 BILATERAL HIP PAIN: ICD-10-CM

## 2025-07-24 DIAGNOSIS — C7A.8 NEUROENDOCRINE CARCINOMA OF LUNG (HCC): ICD-10-CM

## 2025-07-24 PROCEDURE — 71250 CT THORAX DX C-: CPT

## 2025-07-24 PROCEDURE — 73521 X-RAY EXAM HIPS BI 2 VIEWS: CPT

## 2025-08-13 ENCOUNTER — ANESTHESIA EVENT (OUTPATIENT)
Dept: OPERATING ROOM | Age: 75
End: 2025-08-13
Payer: MEDICARE

## 2025-08-13 ENCOUNTER — ANESTHESIA (OUTPATIENT)
Dept: OPERATING ROOM | Age: 75
End: 2025-08-13
Payer: MEDICARE

## 2025-08-13 ENCOUNTER — HOSPITAL ENCOUNTER (OUTPATIENT)
Age: 75
Setting detail: OUTPATIENT SURGERY
Discharge: HOME | End: 2025-08-13
Attending: INTERNAL MEDICINE | Admitting: INTERNAL MEDICINE
Payer: MEDICARE

## 2025-08-13 VITALS
RESPIRATION RATE: 18 BRPM | BODY MASS INDEX: 38.04 KG/M2 | TEMPERATURE: 97.7 F | OXYGEN SATURATION: 97 % | HEIGHT: 66 IN | DIASTOLIC BLOOD PRESSURE: 77 MMHG | WEIGHT: 236.7 LBS | HEART RATE: 61 BPM | SYSTOLIC BLOOD PRESSURE: 140 MMHG

## 2025-08-13 PROBLEM — C7A.00 MALIGNANT CARCINOID TUMOR (HCC): Status: ACTIVE | Noted: 2025-08-13

## 2025-08-13 LAB — GLUCOSE BLD-MCNC: 155 MG/DL (ref 65–105)

## 2025-08-13 PROCEDURE — 2500000003 HC RX 250 WO HCPCS: Performed by: NURSE ANESTHETIST, CERTIFIED REGISTERED

## 2025-08-13 PROCEDURE — 7100000010 HC PHASE II RECOVERY - FIRST 15 MIN: Performed by: INTERNAL MEDICINE

## 2025-08-13 PROCEDURE — 2580000003 HC RX 258: Performed by: ANESTHESIOLOGY

## 2025-08-13 PROCEDURE — 2709999900 HC NON-CHARGEABLE SUPPLY: Performed by: INTERNAL MEDICINE

## 2025-08-13 PROCEDURE — 7100000011 HC PHASE II RECOVERY - ADDTL 15 MIN: Performed by: INTERNAL MEDICINE

## 2025-08-13 PROCEDURE — 3700000001 HC ADD 15 MINUTES (ANESTHESIA): Performed by: INTERNAL MEDICINE

## 2025-08-13 PROCEDURE — 3609018500 HC EGD US SCOPE W/ADJACENT STRUCTURES: Performed by: INTERNAL MEDICINE

## 2025-08-13 PROCEDURE — 82947 ASSAY GLUCOSE BLOOD QUANT: CPT

## 2025-08-13 PROCEDURE — 43259 EGD US EXAM DUODENUM/JEJUNUM: CPT | Performed by: INTERNAL MEDICINE

## 2025-08-13 PROCEDURE — 3700000000 HC ANESTHESIA ATTENDED CARE: Performed by: INTERNAL MEDICINE

## 2025-08-13 PROCEDURE — 6360000002 HC RX W HCPCS: Performed by: NURSE ANESTHETIST, CERTIFIED REGISTERED

## 2025-08-13 RX ORDER — SODIUM CHLORIDE, SODIUM LACTATE, POTASSIUM CHLORIDE, CALCIUM CHLORIDE 600; 310; 30; 20 MG/100ML; MG/100ML; MG/100ML; MG/100ML
INJECTION, SOLUTION INTRAVENOUS CONTINUOUS
Status: DISCONTINUED | OUTPATIENT
Start: 2025-08-13 | End: 2025-08-13 | Stop reason: HOSPADM

## 2025-08-13 RX ORDER — SODIUM CHLORIDE 0.9 % (FLUSH) 0.9 %
5-40 SYRINGE (ML) INJECTION EVERY 12 HOURS SCHEDULED
Status: CANCELLED | OUTPATIENT
Start: 2025-08-13

## 2025-08-13 RX ORDER — SODIUM CHLORIDE 0.9 % (FLUSH) 0.9 %
5-40 SYRINGE (ML) INJECTION EVERY 12 HOURS SCHEDULED
Status: DISCONTINUED | OUTPATIENT
Start: 2025-08-13 | End: 2025-08-13 | Stop reason: HOSPADM

## 2025-08-13 RX ORDER — SODIUM CHLORIDE 9 MG/ML
INJECTION, SOLUTION INTRAVENOUS PRN
Status: DISCONTINUED | OUTPATIENT
Start: 2025-08-13 | End: 2025-08-13 | Stop reason: HOSPADM

## 2025-08-13 RX ORDER — SODIUM CHLORIDE 9 MG/ML
INJECTION, SOLUTION INTRAVENOUS PRN
Status: CANCELLED | OUTPATIENT
Start: 2025-08-13

## 2025-08-13 RX ORDER — LIDOCAINE HYDROCHLORIDE 20 MG/ML
INJECTION, SOLUTION EPIDURAL; INFILTRATION; INTRACAUDAL; PERINEURAL
Status: DISCONTINUED | OUTPATIENT
Start: 2025-08-13 | End: 2025-08-13 | Stop reason: SDUPTHER

## 2025-08-13 RX ORDER — PROPOFOL 10 MG/ML
INJECTION, EMULSION INTRAVENOUS
Status: DISCONTINUED | OUTPATIENT
Start: 2025-08-13 | End: 2025-08-13 | Stop reason: SDUPTHER

## 2025-08-13 RX ORDER — SODIUM CHLORIDE 0.9 % (FLUSH) 0.9 %
5-40 SYRINGE (ML) INJECTION PRN
Status: DISCONTINUED | OUTPATIENT
Start: 2025-08-13 | End: 2025-08-13 | Stop reason: HOSPADM

## 2025-08-13 RX ORDER — LIDOCAINE HYDROCHLORIDE 10 MG/ML
1 INJECTION, SOLUTION EPIDURAL; INFILTRATION; INTRACAUDAL; PERINEURAL
Status: DISCONTINUED | OUTPATIENT
Start: 2025-08-14 | End: 2025-08-13 | Stop reason: HOSPADM

## 2025-08-13 RX ORDER — SODIUM CHLORIDE 0.9 % (FLUSH) 0.9 %
5-40 SYRINGE (ML) INJECTION PRN
Status: CANCELLED | OUTPATIENT
Start: 2025-08-13

## 2025-08-13 RX ORDER — FENTANYL CITRATE 50 UG/ML
25 INJECTION, SOLUTION INTRAMUSCULAR; INTRAVENOUS EVERY 5 MIN PRN
Refills: 0 | Status: CANCELLED | OUTPATIENT
Start: 2025-08-13

## 2025-08-13 RX ORDER — SODIUM CHLORIDE 9 MG/ML
INJECTION, SOLUTION INTRAVENOUS CONTINUOUS
Status: DISCONTINUED | OUTPATIENT
Start: 2025-08-13 | End: 2025-08-13 | Stop reason: HOSPADM

## 2025-08-13 RX ORDER — KETAMINE HCL IN 0.9 % NACL 50 MG/5 ML
SYRINGE (ML) INTRAVENOUS
Status: DISCONTINUED | OUTPATIENT
Start: 2025-08-13 | End: 2025-08-13 | Stop reason: SDUPTHER

## 2025-08-13 RX ORDER — ONDANSETRON 2 MG/ML
4 INJECTION INTRAMUSCULAR; INTRAVENOUS
Status: CANCELLED | OUTPATIENT
Start: 2025-08-13

## 2025-08-13 RX ORDER — HYDROMORPHONE HYDROCHLORIDE 1 MG/ML
0.5 INJECTION, SOLUTION INTRAMUSCULAR; INTRAVENOUS; SUBCUTANEOUS EVERY 5 MIN PRN
Refills: 0 | Status: CANCELLED | OUTPATIENT
Start: 2025-08-13

## 2025-08-13 RX ADMIN — LIDOCAINE HYDROCHLORIDE 60 MG: 20 INJECTION, SOLUTION EPIDURAL; INFILTRATION; INTRACAUDAL; PERINEURAL at 12:52

## 2025-08-13 RX ADMIN — PROPOFOL 100 MG: 10 INJECTION, EMULSION INTRAVENOUS at 12:53

## 2025-08-13 RX ADMIN — PROPOFOL 100 MG: 10 INJECTION, EMULSION INTRAVENOUS at 13:06

## 2025-08-13 RX ADMIN — PROPOFOL 100 MG: 10 INJECTION, EMULSION INTRAVENOUS at 13:00

## 2025-08-13 RX ADMIN — Medication 20 MG: at 13:09

## 2025-08-13 RX ADMIN — Medication 10 MG: at 13:16

## 2025-08-13 RX ADMIN — SODIUM CHLORIDE, SODIUM LACTATE, POTASSIUM CHLORIDE, AND CALCIUM CHLORIDE: .6; .31; .03; .02 INJECTION, SOLUTION INTRAVENOUS at 12:29

## 2025-08-13 RX ADMIN — LIDOCAINE HYDROCHLORIDE 40 MG: 20 INJECTION, SOLUTION EPIDURAL; INFILTRATION; INTRACAUDAL; PERINEURAL at 13:08

## 2025-08-13 ASSESSMENT — PAIN SCALES - GENERAL
PAINLEVEL_OUTOF10: 0

## 2025-08-13 ASSESSMENT — ENCOUNTER SYMPTOMS
CHEST TIGHTNESS: 0
SORE THROAT: 0
VOMITING: 0
TROUBLE SWALLOWING: 0
NAUSEA: 0
SINUS PRESSURE: 0
WHEEZING: 0
RHINORRHEA: 0
SHORTNESS OF BREATH: 0
APNEA: 0
DIARRHEA: 0
COUGH: 0
SHORTNESS OF BREATH: 1
CONSTIPATION: 0
BLOOD IN STOOL: 0
BACK PAIN: 0
ABDOMINAL PAIN: 0

## 2025-08-13 ASSESSMENT — PAIN - FUNCTIONAL ASSESSMENT
PAIN_FUNCTIONAL_ASSESSMENT: 0-10
PAIN_FUNCTIONAL_ASSESSMENT: 0-10

## (undated) DEVICE — FORCEPS BX L100CM DIA1.8MM WRK CHN 2MM PULM S STL RAD JAW 4

## (undated) DEVICE — BITE BLOCK ENDOSCP AD 60 FR W/ ADJ STRP PLAS GRN BLOX

## (undated) DEVICE — ST CHARLES BRONCHOSCOPY PACK: Brand: MEDLINE INDUSTRIES, INC.

## (undated) DEVICE — SINGLE USE BIOPSY VALVE MAJ-210: Brand: SINGLE USE BIOPSY VALVE (STERILE)

## (undated) DEVICE — TRAP SPEC 40CC MUCUS OPN SUCT

## (undated) DEVICE — SYRINGE MED 10ML SLIP TIP BLNT FILL AND LUERLOCK DISP

## (undated) DEVICE — ENDOSCOPIC KIT 1.1+ 10 FT DE 2 GWN AAMI LEVEL 3

## (undated) DEVICE — SINGLE USE SUCTION VALVE MAJ-209: Brand: SINGLE USE SUCTION VALVE (STERILE)

## (undated) DEVICE — UNDERPAD HOSP W30XL36IN GRN POLYPR HI ABSRB DISP

## (undated) DEVICE — MANIFOLD SUCT 4 PRT 2 CANSTR FLTR DISP NEPTUNE 2

## (undated) DEVICE — CUP MED 60ML 2OZ CLR GRAD DISP PLAS NO LID

## (undated) DEVICE — ADAPTER TBNG DIA15MM SWVL FBROPT BRONCHSCP TERM 2 AXIS PEEP